# Patient Record
Sex: MALE | Race: WHITE
[De-identification: names, ages, dates, MRNs, and addresses within clinical notes are randomized per-mention and may not be internally consistent; named-entity substitution may affect disease eponyms.]

---

## 2018-02-28 ENCOUNTER — HOSPITAL ENCOUNTER (EMERGENCY)
Dept: HOSPITAL 91 - FTE | Age: 38
Discharge: HOME | End: 2018-02-28
Payer: COMMERCIAL

## 2018-02-28 ENCOUNTER — HOSPITAL ENCOUNTER (EMERGENCY)
Age: 38
Discharge: HOME | End: 2018-02-28

## 2018-02-28 DIAGNOSIS — H66.91: Primary | ICD-10-CM

## 2018-02-28 PROCEDURE — 99283 EMERGENCY DEPT VISIT LOW MDM: CPT

## 2019-05-22 ENCOUNTER — HOSPITAL ENCOUNTER (EMERGENCY)
Dept: HOSPITAL 12 - ER | Age: 39
Discharge: HOME | End: 2019-05-22
Payer: COMMERCIAL

## 2019-05-22 VITALS — HEIGHT: 72 IN | WEIGHT: 145 LBS | BODY MASS INDEX: 19.64 KG/M2

## 2019-05-22 VITALS — DIASTOLIC BLOOD PRESSURE: 86 MMHG | SYSTOLIC BLOOD PRESSURE: 122 MMHG

## 2019-05-22 DIAGNOSIS — J44.9: ICD-10-CM

## 2019-05-22 DIAGNOSIS — R19.7: ICD-10-CM

## 2019-05-22 DIAGNOSIS — J22: Primary | ICD-10-CM

## 2019-05-22 DIAGNOSIS — Z59.0: ICD-10-CM

## 2019-05-22 DIAGNOSIS — F17.290: ICD-10-CM

## 2019-05-22 PROCEDURE — 99406 BEHAV CHNG SMOKING 3-10 MIN: CPT

## 2019-05-22 PROCEDURE — 94640 AIRWAY INHALATION TREATMENT: CPT

## 2019-05-22 PROCEDURE — A4663 DIALYSIS BLOOD PRESSURE CUFF: HCPCS

## 2019-05-22 PROCEDURE — 71045 X-RAY EXAM CHEST 1 VIEW: CPT

## 2019-05-22 PROCEDURE — 99283 EMERGENCY DEPT VISIT LOW MDM: CPT

## 2019-05-22 SDOH — ECONOMIC STABILITY - HOUSING INSECURITY: HOMELESSNESS: Z59.0

## 2019-06-02 ENCOUNTER — HOSPITAL ENCOUNTER (INPATIENT)
Dept: HOSPITAL 91 - E/R | Age: 39
LOS: 6 days | Discharge: LEFT BEFORE DISCHARGE | DRG: 853 | End: 2019-06-08
Payer: COMMERCIAL

## 2019-06-02 ENCOUNTER — HOSPITAL ENCOUNTER (INPATIENT)
Dept: HOSPITAL 10 - E/R | Age: 39
LOS: 6 days | Discharge: LEFT BEFORE DISCHARGE | DRG: 853 | End: 2019-06-08
Attending: INTERNAL MEDICINE | Admitting: INTERNAL MEDICINE
Payer: COMMERCIAL

## 2019-06-02 VITALS
WEIGHT: 142.42 LBS | HEIGHT: 70 IN | BODY MASS INDEX: 20.39 KG/M2 | HEIGHT: 70 IN | WEIGHT: 142.42 LBS | BODY MASS INDEX: 20.39 KG/M2

## 2019-06-02 DIAGNOSIS — Z72.0: ICD-10-CM

## 2019-06-02 DIAGNOSIS — J18.9: ICD-10-CM

## 2019-06-02 DIAGNOSIS — F11.20: ICD-10-CM

## 2019-06-02 DIAGNOSIS — A41.9: Primary | ICD-10-CM

## 2019-06-02 DIAGNOSIS — L02.31: ICD-10-CM

## 2019-06-02 PROCEDURE — 84484 ASSAY OF TROPONIN QUANT: CPT

## 2019-06-02 PROCEDURE — 74177 CT ABD & PELVIS W/CONTRAST: CPT

## 2019-06-02 PROCEDURE — 85730 THROMBOPLASTIN TIME PARTIAL: CPT

## 2019-06-02 PROCEDURE — 84443 ASSAY THYROID STIM HORMONE: CPT

## 2019-06-02 PROCEDURE — 84100 ASSAY OF PHOSPHORUS: CPT

## 2019-06-02 PROCEDURE — 71045 X-RAY EXAM CHEST 1 VIEW: CPT

## 2019-06-02 PROCEDURE — 83690 ASSAY OF LIPASE: CPT

## 2019-06-02 PROCEDURE — 85610 PROTHROMBIN TIME: CPT

## 2019-06-02 PROCEDURE — 87102 FUNGUS ISOLATION CULTURE: CPT

## 2019-06-02 PROCEDURE — 87040 BLOOD CULTURE FOR BACTERIA: CPT

## 2019-06-02 PROCEDURE — 87075 CULTR BACTERIA EXCEPT BLOOD: CPT

## 2019-06-02 PROCEDURE — 83735 ASSAY OF MAGNESIUM: CPT

## 2019-06-02 PROCEDURE — 87070 CULTURE OTHR SPECIMN AEROBIC: CPT

## 2019-06-02 PROCEDURE — 80048 BASIC METABOLIC PNL TOTAL CA: CPT

## 2019-06-02 PROCEDURE — 85025 COMPLETE CBC W/AUTO DIFF WBC: CPT

## 2019-06-02 PROCEDURE — 83036 HEMOGLOBIN GLYCOSYLATED A1C: CPT

## 2019-06-02 PROCEDURE — 80053 COMPREHEN METABOLIC PANEL: CPT

## 2019-06-02 PROCEDURE — 80202 ASSAY OF VANCOMYCIN: CPT

## 2019-06-02 PROCEDURE — 73510: CPT

## 2019-06-02 PROCEDURE — 83605 ASSAY OF LACTIC ACID: CPT

## 2019-06-02 PROCEDURE — 93005 ELECTROCARDIOGRAM TRACING: CPT

## 2019-06-03 VITALS — RESPIRATION RATE: 13 BRPM | DIASTOLIC BLOOD PRESSURE: 53 MMHG | HEART RATE: 90 BPM | SYSTOLIC BLOOD PRESSURE: 95 MMHG

## 2019-06-03 VITALS — SYSTOLIC BLOOD PRESSURE: 109 MMHG | HEART RATE: 86 BPM | RESPIRATION RATE: 15 BRPM | DIASTOLIC BLOOD PRESSURE: 72 MMHG

## 2019-06-03 VITALS — DIASTOLIC BLOOD PRESSURE: 73 MMHG | HEART RATE: 90 BPM | SYSTOLIC BLOOD PRESSURE: 105 MMHG | RESPIRATION RATE: 16 BRPM

## 2019-06-03 VITALS — DIASTOLIC BLOOD PRESSURE: 64 MMHG | SYSTOLIC BLOOD PRESSURE: 105 MMHG | HEART RATE: 86 BPM | RESPIRATION RATE: 17 BRPM

## 2019-06-03 VITALS — HEART RATE: 86 BPM | SYSTOLIC BLOOD PRESSURE: 104 MMHG | DIASTOLIC BLOOD PRESSURE: 66 MMHG | RESPIRATION RATE: 17 BRPM

## 2019-06-03 VITALS — RESPIRATION RATE: 16 BRPM | SYSTOLIC BLOOD PRESSURE: 102 MMHG | DIASTOLIC BLOOD PRESSURE: 64 MMHG | HEART RATE: 86 BPM

## 2019-06-03 VITALS — HEART RATE: 90 BPM | RESPIRATION RATE: 18 BRPM | SYSTOLIC BLOOD PRESSURE: 105 MMHG | DIASTOLIC BLOOD PRESSURE: 58 MMHG

## 2019-06-03 VITALS — RESPIRATION RATE: 16 BRPM | DIASTOLIC BLOOD PRESSURE: 71 MMHG | HEART RATE: 82 BPM | SYSTOLIC BLOOD PRESSURE: 107 MMHG

## 2019-06-03 VITALS — DIASTOLIC BLOOD PRESSURE: 50 MMHG | RESPIRATION RATE: 16 BRPM | HEART RATE: 81 BPM | SYSTOLIC BLOOD PRESSURE: 93 MMHG

## 2019-06-03 VITALS — RESPIRATION RATE: 18 BRPM | HEART RATE: 75 BPM | DIASTOLIC BLOOD PRESSURE: 69 MMHG | SYSTOLIC BLOOD PRESSURE: 106 MMHG

## 2019-06-03 VITALS — HEART RATE: 90 BPM | DIASTOLIC BLOOD PRESSURE: 71 MMHG | SYSTOLIC BLOOD PRESSURE: 98 MMHG | RESPIRATION RATE: 14 BRPM

## 2019-06-03 VITALS — HEART RATE: 84 BPM | DIASTOLIC BLOOD PRESSURE: 67 MMHG | RESPIRATION RATE: 17 BRPM | SYSTOLIC BLOOD PRESSURE: 107 MMHG

## 2019-06-03 VITALS — DIASTOLIC BLOOD PRESSURE: 62 MMHG | SYSTOLIC BLOOD PRESSURE: 101 MMHG | HEART RATE: 84 BPM | RESPIRATION RATE: 18 BRPM

## 2019-06-03 VITALS — RESPIRATION RATE: 18 BRPM | DIASTOLIC BLOOD PRESSURE: 68 MMHG | SYSTOLIC BLOOD PRESSURE: 109 MMHG | HEART RATE: 84 BPM

## 2019-06-03 VITALS — SYSTOLIC BLOOD PRESSURE: 93 MMHG | HEART RATE: 95 BPM | RESPIRATION RATE: 20 BRPM | DIASTOLIC BLOOD PRESSURE: 58 MMHG

## 2019-06-03 VITALS — DIASTOLIC BLOOD PRESSURE: 55 MMHG | SYSTOLIC BLOOD PRESSURE: 97 MMHG | RESPIRATION RATE: 15 BRPM | HEART RATE: 86 BPM

## 2019-06-03 VITALS — DIASTOLIC BLOOD PRESSURE: 62 MMHG | SYSTOLIC BLOOD PRESSURE: 110 MMHG | HEART RATE: 82 BPM | RESPIRATION RATE: 17 BRPM

## 2019-06-03 VITALS — HEART RATE: 80 BPM | RESPIRATION RATE: 16 BRPM | SYSTOLIC BLOOD PRESSURE: 98 MMHG | DIASTOLIC BLOOD PRESSURE: 60 MMHG

## 2019-06-03 VITALS — RESPIRATION RATE: 19 BRPM | HEART RATE: 82 BPM | DIASTOLIC BLOOD PRESSURE: 62 MMHG | SYSTOLIC BLOOD PRESSURE: 111 MMHG

## 2019-06-03 LAB
ADD MAN DIFF?: NO
ALANINE AMINOTRANSFERASE: 37 IU/L (ref 13–69)
ALBUMIN/GLOBULIN RATIO: 0.93
ALBUMIN: 4.1 G/DL (ref 3.3–4.9)
ALKALINE PHOSPHATASE: 182 IU/L (ref 42–121)
ANION GAP: 13 (ref 5–13)
ASPARTATE AMINO TRANSFERASE: 40 IU/L (ref 15–46)
BASOPHIL #: 0.1 10^3/UL (ref 0–0.1)
BASOPHILS %: 0.3 % (ref 0–2)
BILIRUBIN,DIRECT: 0 MG/DL (ref 0–0.2)
BILIRUBIN,TOTAL: 0.9 MG/DL (ref 0.2–1.3)
BLOOD UREA NITROGEN: 9 MG/DL (ref 7–20)
CALCIUM: 9.7 MG/DL (ref 8.4–10.2)
CARBON DIOXIDE: 31 MMOL/L (ref 21–31)
CHLORIDE: 90 MMOL/L (ref 97–110)
CREATININE: 0.75 MG/DL (ref 0.61–1.24)
EOSINOPHILS #: 0 10^3/UL (ref 0–0.5)
EOSINOPHILS %: 0 % (ref 0–7)
GLOBULIN: 4.4 G/DL (ref 1.3–3.2)
GLUCOSE: 124 MG/DL (ref 70–220)
HEMATOCRIT: 42.4 % (ref 42–52)
HEMOGLOBIN: 14.8 G/DL (ref 14–18)
IMMATURE GRANS #M: 0.14 10^3/UL (ref 0–0.03)
IMMATURE GRANS % (M): 0.7 % (ref 0–0.43)
INR: 1.18
LACTIC ACID: 1 MMOL/L (ref 0.5–2)
LACTIC ACID: 2.6 MMOL/L (ref 0.5–2)
LIPASE: 59 U/L (ref 23–300)
LYMPHOCYTES #: 1.2 10^3/UL (ref 0.8–2.9)
LYMPHOCYTES %: 5.6 % (ref 15–51)
MEAN CORPUSCULAR HEMOGLOBIN: 30.6 PG (ref 29–33)
MEAN CORPUSCULAR HGB CONC: 34.9 G/DL (ref 32–37)
MEAN CORPUSCULAR VOLUME: 87.6 FL (ref 82–101)
MEAN PLATELET VOLUME: 8.3 FL (ref 7.4–10.4)
MONOCYTE #: 1.4 10^3/UL (ref 0.3–0.9)
MONOCYTES %: 6.5 % (ref 0–11)
NEUTROPHIL #: 18.4 10^3/UL (ref 1.6–7.5)
NEUTROPHILS %: 86.9 % (ref 39–77)
NUCLEATED RED BLOOD CELLS #: 0 10^3/UL (ref 0–0)
NUCLEATED RED BLOOD CELLS%: 0 /100WBC (ref 0–0)
PARTIAL THROMBOPLASTIN TIME: 33.5 SEC (ref 23–35)
PLATELET COUNT: 453 10^3/UL (ref 140–415)
POTASSIUM: 4.1 MMOL/L (ref 3.5–5.1)
PROTIME: 15.1 SEC (ref 11.9–14.9)
PT RATIO: 1.2
RED BLOOD COUNT: 4.84 10^6/UL (ref 4.7–6.1)
RED CELL DISTRIBUTION WIDTH: 12 % (ref 11.5–14.5)
SODIUM: 134 MMOL/L (ref 135–144)
TOTAL PROTEIN: 8.5 G/DL (ref 6.1–8.1)
TROPONIN-I: < 0.012 NG/ML (ref 0–0.12)
WHITE BLOOD COUNT: 21.2 10^3/UL (ref 4.8–10.8)

## 2019-06-03 PROCEDURE — 0J990ZZ DRAINAGE OF BUTTOCK SUBCUTANEOUS TISSUE AND FASCIA, OPEN APPROACH: ICD-10-PCS | Performed by: SURGERY

## 2019-06-03 PROCEDURE — 0J990ZZ DRAINAGE OF BUTTOCK SUBCUTANEOUS TISSUE AND FASCIA, OPEN APPROACH: ICD-10-PCS

## 2019-06-03 RX ADMIN — ASCORBIC ACID, VITAMIN A PALMITATE, CHOLECALCIFEROL, THIAMINE HYDROCHLORIDE, RIBOFLAVIN-5 PHOSPHATE SODIUM, PYRIDOXINE HYDROCHLORIDE, NIACINAMIDE, DEXPANTHENOL, ALPHA-TOCOPHEROL ACETATE, VITAMIN K1, FOLIC ACID, BIOTIN, CYANOCOBALAMIN SCH MLS/HR: 200; 3300; 200; 6; 3.6; 6; 40; 15; 10; 150; 600; 60; 5 INJECTION, SOLUTION INTRAVENOUS at 20:37

## 2019-06-03 RX ADMIN — MORPHINE SULFATE 1 MG: 2 INJECTION, SOLUTION INTRAMUSCULAR; INTRAVENOUS at 04:19

## 2019-06-03 RX ADMIN — PIPERACILLIN SODIUM AND TAZOBACTAM SODIUM 1 MLS/HR: 3; .375 INJECTION, POWDER, LYOPHILIZED, FOR SOLUTION INTRAVENOUS at 13:09

## 2019-06-03 RX ADMIN — ONDANSETRON HYDROCHLORIDE 1 MG: 2 INJECTION, SOLUTION INTRAMUSCULAR; INTRAVENOUS at 00:32

## 2019-06-03 RX ADMIN — MORPHINE SULFATE 1 MG: 2 INJECTION, SOLUTION INTRAMUSCULAR; INTRAVENOUS at 08:25

## 2019-06-03 RX ADMIN — PIPERACILLIN SODIUM AND TAZOBACTAM SODIUM 1 MLS/HR: 3; .375 INJECTION, POWDER, LYOPHILIZED, FOR SOLUTION INTRAVENOUS at 01:45

## 2019-06-03 RX ADMIN — SODIUM CHLORIDE 1 MG: 9 INJECTION, SOLUTION INTRAVENOUS at 10:03

## 2019-06-03 RX ADMIN — SODIUM CHLORIDE 1 MG: 9 INJECTION, SOLUTION INTRAVENOUS at 13:57

## 2019-06-03 RX ADMIN — THIAMINE HYDROCHLORIDE 1 MLS/HR: 100 INJECTION, SOLUTION INTRAMUSCULAR; INTRAVENOUS at 15:54

## 2019-06-03 RX ADMIN — FOLIC ACID SCH MLS/HR: 5 INJECTION, SOLUTION INTRAMUSCULAR; INTRAVENOUS; SUBCUTANEOUS at 03:27

## 2019-06-03 RX ADMIN — PIPERACILLIN SODIUM AND TAZOBACTAM SODIUM SCH MLS/HR: 3; .375 INJECTION, POWDER, LYOPHILIZED, FOR SOLUTION INTRAVENOUS at 13:09

## 2019-06-03 RX ADMIN — PIPERACILLIN SODIUM AND TAZOBACTAM SODIUM SCH MLS/HR: 3; .375 INJECTION, POWDER, LYOPHILIZED, FOR SOLUTION INTRAVENOUS at 05:48

## 2019-06-03 RX ADMIN — MIDAZOLAM HYDROCHLORIDE 1 MG: 2 INJECTION, SOLUTION INTRAMUSCULAR; INTRAVENOUS at 18:47

## 2019-06-03 RX ADMIN — THIAMINE HYDROCHLORIDE 1 ML: 100 INJECTION, SOLUTION INTRAMUSCULAR; INTRAVENOUS at 00:33

## 2019-06-03 RX ADMIN — FOLIC ACID SCH MLS/HR: 5 INJECTION, SOLUTION INTRAMUSCULAR; INTRAVENOUS; SUBCUTANEOUS at 15:54

## 2019-06-03 RX ADMIN — HYDROMORPHONE HYDROCHLORIDE 1 MG: 1 INJECTION, SOLUTION INTRAMUSCULAR; INTRAVENOUS; SUBCUTANEOUS at 02:14

## 2019-06-03 RX ADMIN — SODIUM CHLORIDE 1 MG: 9 INJECTION, SOLUTION INTRAVENOUS at 05:47

## 2019-06-03 RX ADMIN — VANCOMYCIN HYDROCHLORIDE 1 MLS/HR: 1 INJECTION, POWDER, LYOPHILIZED, FOR SOLUTION INTRAVENOUS at 02:33

## 2019-06-03 RX ADMIN — PIPERACILLIN SODIUM AND TAZOBACTAM SODIUM SCH MLS/HR: 3; .375 INJECTION, POWDER, LYOPHILIZED, FOR SOLUTION INTRAVENOUS at 20:37

## 2019-06-03 RX ADMIN — THIAMINE HYDROCHLORIDE 1 MLS/HR: 100 INJECTION, SOLUTION INTRAMUSCULAR; INTRAVENOUS at 03:27

## 2019-06-03 RX ADMIN — SODIUM CHLORIDE SCH MLS/HR: 0.9 INJECTION, SOLUTION INTRAVENOUS at 13:59

## 2019-06-03 RX ADMIN — VASOPRESSIN 1 ML/8 S: 20 INJECTION, SOLUTION INTRAMUSCULAR; SUBCUTANEOUS at 13:25

## 2019-06-03 RX ADMIN — IOHEXOL 1 ML: 300 INJECTION, SOLUTION INTRAVENOUS at 13:25

## 2019-06-03 RX ADMIN — LIDOCAINE HYDROCHLORIDE 1 MLS/HR: 10 INJECTION, SOLUTION EPIDURAL; INFILTRATION; INTRACAUDAL; PERINEURAL at 01:05

## 2019-06-03 RX ADMIN — PIPERACILLIN SODIUM AND TAZOBACTAM SODIUM 1 MLS/HR: 3; .375 INJECTION, POWDER, LYOPHILIZED, FOR SOLUTION INTRAVENOUS at 05:48

## 2019-06-03 RX ADMIN — SODIUM CHLORIDE 1 ML: 9 INJECTION, SOLUTION INTRAMUSCULAR; INTRAVENOUS; SUBCUTANEOUS at 13:25

## 2019-06-03 RX ADMIN — ASCORBIC ACID, VITAMIN A PALMITATE, CHOLECALCIFEROL, THIAMINE HYDROCHLORIDE, RIBOFLAVIN-5 PHOSPHATE SODIUM, PYRIDOXINE HYDROCHLORIDE, NIACINAMIDE, DEXPANTHENOL, ALPHA-TOCOPHEROL ACETATE, VITAMIN K1, FOLIC ACID, BIOTIN, CYANOCOBALAMIN 1 MLS/HR: 200; 3300; 200; 6; 3.6; 6; 40; 15; 10; 150; 600; 60; 5 INJECTION, SOLUTION INTRAVENOUS at 20:37

## 2019-06-03 RX ADMIN — PIPERACILLIN SODIUM AND TAZOBACTAM SODIUM 1 MLS/HR: 3; .375 INJECTION, POWDER, LYOPHILIZED, FOR SOLUTION INTRAVENOUS at 20:37

## 2019-06-03 RX ADMIN — VANCOMYCIN HYDROCHLORIDE 1 MLS/HR: 500 INJECTION, POWDER, LYOPHILIZED, FOR SOLUTION INTRAVENOUS at 13:59

## 2019-06-03 NOTE — CONS
Assessment/Plan


Assessment/Plan


Assessment/Plan (Daily)


Left gluteal/buttock abscess


Will need I&D in OR under IV sedation


All benefits, risks, alternatives discussed in detail.  All questions answered. 


The patient elects to proceed.





Consultation Date/Type/Reason


Admit Date/Time


3 Shayy 2019


Date/Time of Note


DATE: 6/3/19 


TIME: 16:53





Hx of Present Illness


The patient is a 38-year-old male who did intramuscular injection of heroin 


approximately 3 weeks ago.  He likely is a dirty needle.  He started developing 


pain and discomfort soon after.  The pain and discomfort got worse over the last


4 days he could not walk due to the infection.  He has fevers and chills.  He 


has some nausea.





Homeless and finally called an ambulance was brought to the ER.  His work-up was


consistent with a left buttock abscess.





Past Medical History


Medical History:  other (IV drug abuse)


Home Meds


Active Scripts


Amoxicillin* (Amoxicillin*) 500 Mg Cap, 500 MG PO BID for 7 Days, CAP


   Prov:ARNAV ROTHMAN         18


Ibuprofen* (Motrin*) 600 Mg Tab, 600 MG PO Q6, #30 TAB


   Prov:ARNAV ROTHMAN         18


Medications





Current Medications


Sodium Chloride 1,000 ml @  75 mls/hr [id number] IV  Last administered on 6/3/19at 


03:27; Admin Dose 75 MLS/HR;  Start 6/3/19 at 02:34


IV Flush (NS 3 ml) 3 ml PER PROTOCOL IV ;  Start 6/3/19 at 03:00


Ondansetron HCl (Zofran Inj) 4 mg Q6H  PRN IV NAUSEA/VOMITING;  Start 6/3/19 at 


03:00


Morphine Sulfate (morphine) 2 mg Q4H  PRN IV .SEVERE PAIN 7-10 Last administered


on 6/3/19at 04:19; Admin Dose 2 MG;  Start 6/3/19 at 03:00


Piperacillin Sod/ Tazobactam Sod 100 ml @  200 mls/hr Q6 IVPB  Last administered


on 6/3/19at 13:09; Admin Dose 200 MLS/HR;  Start 6/3/19 at 06:00


Vancomycin HCl (Vanco Iv Per Pharmacy) VANCOMYCIN PER PHARMACY PER PROTOCOL XX ;


 Start 6/3/19 at 03:30


Albuterol/ Ipratropium (Duoneb) 3 ml Q4H RESP THERAPY  PRN HHN SHORTNESS OF 


BREATH;  Start 6/3/19 at 04:00


Vancomycin HCl 1.25 gm/Sodium Chloride 250 ml @  83.333 mls/ hr Q12H IVPB  Last 


administered on 6/3/19at 13:59; Admin Dose 83.333 MLS/HR;  Start 6/3/19 at 14:00


Morphine Sulfate (morphine) 6 mg Q4H  PRN IV SEVERE PAIN LEVEL 7-10 Last 


administered on 6/3/19at 13:57; Admin Dose 6 MG;  Start 6/3/19 at 05:30


Miscellaneous Information (*Rx Drug Level Order Reminder*) 1 1300  ONCE XX ;  


Start 19 at 13:00;  Stop 19 at 13:01


Allergies:  


Coded Allergies:  


     No Known Drug Allergies (Verified  Allergy, Unknown, 18)





Past Surgical History


Past Surgical Hx:  other (Left hand surgery)





Social History


Alcohol Use:  none


Smoking Status:  Current every day smoker (1-2 cigs per day )


Drug Use:  heroin (Including intra-muscular injection. ), other (Methamphetamine


twice a week)





Exam/Review of Systems


Exam


Vitals





Vital Signs


  Date      Temp  Pulse  Resp  B/P (MAP)   Pulse Ox  O2          O2 Flow    FiO2


Time                                                 Delivery    Rate


    6/3/19  99.2     80    16  98/60 (73)        92


     14:50


    6/3/19                                           Room Air


     02:52








Intake and Output





6/2/19


6/2/19


6/3/19





1515:00


23:00


07:00





IntakeIntake Total


463 ml





OutputOutput Total


300 ml





BalanceBalance


163 ml











Constitutional:  alert, oriented, well developed


Psych:  no complaints


Head:  normocephalic, atraumatic


ENMT:  nl external ears & nose


Neck:  supple


Respiratory:  clear to auscultation, normal air movement


Cardiovascular:  regular rate and rhythm


Gastrointestinal:  soft, nl liver, spleen, non-tender


Musculoskeletal:  nl extremities to inspection, nl gait and stance


Extremities:  normal pulses


Neurological:  CNS II-XII intact, nl mental status


Skin:  other (Tender, erythematous, indurated area to left buttock/flank 


consistent with abscess)


Lymph:  nl lymph nodes





Results


Result Diagram:  


6/3/19 0028                                                                     


          6/3/19 0028





Results 24hrs





Laboratory Tests


Test
                     6/3/19
00:20  6/3/19
00:28  6/3/19
02:20  6/3/19
04:20


POC Venous Lactate             2.2  *H


White Blood Count                            21.2  H


Red Blood Count                               4.84


Hemoglobin                                    14.8


Hematocrit                                    42.4


Mean Corpuscular Volume                       87.6


Mean Corpuscular                              30.6


Hemoglobin


Mean Corpuscular          
                  34.9  
  
             



Hemoglobin
Concent


Red Cell Distribution                         12.0


Width


Platelet Count                                453  H


Mean Platelet Volume                           8.3


Immature Granulocytes %                     0.700  H


Neutrophils %                                86.9  H


Lymphocytes %                                 5.6  L


Monocytes %                                    6.5


Eosinophils %                                  0.0


Basophils %                                    0.3


Nucleated Red Blood                            0.0


Cells %


Immature Granulocytes #                     0.140  H


Neutrophils #                                18.4  H


Lymphocytes #                                  1.2


Monocytes #                                   1.4  H


Eosinophils #                                  0.0


Basophils #                                    0.1


Nucleated Red Blood                            0.0


Cells #


Prothrombin Time                             15.1  H


Prothrombin Time Ratio                         1.2


INR International         
                  1.18  
  
             



Normalized
Ratio


Activated                 
                  33.5  
  
             



Partial
Thromboplast


Time


Sodium Level                                  134  L


Potassium Level                                4.1


Chloride Level                                 90  L


Carbon Dioxide Level                            31


Anion Gap                                       13


Blood Urea Nitrogen                              9


Creatinine                                    0.75


Est Glomerular Filtrat    
             > 60  
       
             



Rate
mL/min


Glucose Level                                  124


Calcium Level                                  9.7


Total Bilirubin                                0.9


Direct Bilirubin                              0.00


Indirect Bilirubin                             0.9


Aspartate Amino           
                    40  
  
             



Transf
(AST/SGOT)


Alanine                   
                    37  
  
             



Aminotransferase
(ALT/SG


PT)


Alkaline Phosphatase                          182  H


Troponin I                              < 0.012


Total Protein                                 8.5  H


Albumin                                        4.1


Globulin                                     4.40  H


Albumin/Globulin Ratio                        0.93


Lipase                                          59


Lactic Acid Level                                            1.0         2.6  *H








Imaging


Imaging


Patient: JENNIFER TRENT   : 1980   Age: 38  Sex: M                    


   


       MR #:    [id number]   Acct #:   [id number]    DOS: 19 0305


Ordering MD: KAREN BLACK MD   Location:  [facility]   Room/Bed:  [facility]            


                             


                                        


PROCEDURE:   CT Abdomen and Pelvis with contrast. 


 


CLINICAL INDICATION:   Left hip abscess


 


TECHNIQUE:   CT scan of the abdomen and pelvis with contrast was performed on a 


multi-detector high-resolution CT scanner.  The patient was scanned following 


the uncomplicated intravenous administration of 90 cc of Omnipaque-300 IV 


contrast.  Coronal and sagittal reformatted images were obtained from the axial 


source images. DICOM images are available. CTDI equals 6.76 mGy, and DLP equals 


474.78 mGy-cm.


 


One or more of the following dose reduction techniques were used:


- Automated exposure control.


- Adjustment of the mA and/or kV according to patient size.


- Use of iterative reconstruction technique.


 


COMPARISON: None. 


 


FINDINGS:


 


Lower thorax: Patchy ground-glass opacities of the lateral right lower lung may 


represent atelectasis versus small area of infiltrate. Small focus of infiltrate


seen in the left lower lung.


 


Liver: Normal. Patent portal vein.


 


Biliary: Normal gallbladder. No biliary dilatation. 


 


Pancreas: Normal.


 


Spleen: Normal.


 


Adrenal Glands: Normal.


 


Genitourinary: No hydronephrosis or nephrolithiasis. Bladder is moderately 


distended, otherwise unremarkable.


 


Gastrointestinal: Stomach is decompressed. Gaseous distension of the colon as 


well as multiple small bowel loops likely suggestive of ileus. No evidence of 


bowel obstruction.


 


Lymph nodes: No adenopathy.


 


Vascular: Normal-caliber of the abdominal aorta.


 


Peritoneum/mesentery: Trace free pelvic fluid.


 


Reproductive organs: Normal.


 


Musculoskeletal: There is elongated walled off fluid collection with rim 


enhancement in the subcutaneous soft tissue overlying superior aspect of the 


left gluteal region measures 11.5 x 3.0 x 8.3 cm. Additional more complex walled


off fluid collection with peripheral enhancement more distally measures 8.2 x 


3.2 x 5.5 cm which likely extends to the adjacent gluteal muscles.. Subcutaneous


air with likely skin laceration seen in the most lateral portion of the 


collection.


 


Abdominal wall: Normal


 


IMPRESSION:


 


1.  There are 2 walled off fluid collections with peripheral enhancement in the 


subcutaneous soft tissue overlying left gluteal region measures 11.5 and 8.2 cm 


and likely represents an abscess.


2.  Mild gaseous distension of the multiple loops of small and large bowel 


likely suggestive of mild ileus.


3.  Patchy ground-glass opacities of the periphery of the right lower lobe as 


well as small area of left lung base likely due to atelectasis versus 


infectious/inflammatory process.


 


 


RPTAT: QQ


_____________________________________________ 


rm-isaiah Mccall, Physician           Date    Time





Medications


Medication





Current Medications


Sodium Chloride 1,000 ml @  75 mls/hr [id number] IV  Last administered on 6/3/19at 


03:27; Admin Dose 75 MLS/HR;  Start 6/3/19 at 02:34


IV Flush (NS 3 ml) 3 ml PER PROTOCOL IV ;  Start 6/3/19 at 03:00


Ondansetron HCl (Zofran Inj) 4 mg Q6H  PRN IV NAUSEA/VOMITING;  Start 6/3/19 at 


03:00


Morphine Sulfate (morphine) 2 mg Q4H  PRN IV .SEVERE PAIN 7-10 Last administered


on 6/3/19at 04:19; Admin Dose 2 MG;  Start 6/3/19 at 03:00


Piperacillin Sod/ Tazobactam Sod 100 ml @  200 mls/hr Q6 IVPB  Last administered


on 6/3/19at 13:09; Admin Dose 200 MLS/HR;  Start 6/3/19 at 06:00


Vancomycin HCl (Vanco Iv Per Pharmacy) VANCOMYCIN PER PHARMACY PER PROTOCOL XX ;


 Start 6/3/19 at 03:30


Albuterol/ Ipratropium (Duoneb) 3 ml Q4H RESP THERAPY  PRN HHN SHORTNESS OF 


BREATH;  Start 6/3/19 at 04:00


Vancomycin HCl 1.25 gm/Sodium Chloride 250 ml @  83.333 mls/ hr Q12H IVPB  Last 


administered on 6/3/19at 13:59; Admin Dose 83.333 MLS/HR;  Start 6/3/19 at 14:00


Morphine Sulfate (morphine) 6 mg Q4H  PRN IV SEVERE PAIN LEVEL 7-10 Last 


administered on 6/3/19at 13:57; Admin Dose 6 MG;  Start 6/3/19 at 05:30


Miscellaneous Information (*Rx Drug Level Order Reminder*) 1 1300  ONCE XX ;  


Start 19 at 13:00;  Stop 19 at 13:01











WILLIS BARNES MD            Jose 3, 2019 16:55

## 2019-06-03 NOTE — PAC
Date/Time of Note


Date/Time of Note


DATE: 6/3/19 


TIME: 18:32





Post-Anesthesia Notes


Post-Anesthesia Note


Last documented vital signs





Vital Signs


  Date      Temp  Pulse  Resp  B/P (MAP)   Pulse Ox  O2          O2 Flow    FiO2


Time                                                 Delivery    Rate


    6/3/19  99.2     80    16  98/60 (73)        92


     14:50


    6/3/19                                           Room Air


     02:52





Activity:  WNL


Respiratory function:  WNL


Cardiovascular function:  WNL


Mental status:  Baseline


Pain reasonably controlled:  Yes


Hydration appropriate:  Yes


Nausea/Vomiting absent:  Yes











Juan Thornton M.D.       Jose 3, 2019 18:32

## 2019-06-03 NOTE — OPR
Date/Time of Note


Date/Time of Note


DATE: 6/3/19 


TIME: 17:52





Operative Report


Preoperative Diagnosis


Left gluteal abscess


Postoperative Diagnosis


Same


Operation/Procedure Performed


Incision and drainage of left gluteal abscess


Surgeon


see signature line


Assistant


None


Anesthesia Type:  general


Anesthesiologist:  Juan Thornton M.D.


Estimated Blood Loss:  minimal


Transfusion


   none


Specimen


Cultures


Grafts/Implants


none


Complications


none


Pt Condition Post Procedure:  stable


Disposition:  PACU


Indications


The patient is a 38-year-old IV drug abuser.  He injected heroin into his left 


buttock 3 weeks ago.  He developed a large abscess.  He requires incision and 


drainage.  All benefits, risks, alternatives were discussed in detail.  All 


questions answered.  The patient elects to proceed.


Procedure Description


The patient was brought to operative room placed in the right lateral decubitus 


position.  IV sedation was given.  





The left buttocks, flank and leg were cleaned, prepped, draped in usual sterile 


fashion.  The area was widely infiltrated with half percent Marcaine.  A 


cruciate incision was made over the abscess.  Pus was expressed.  Cultures were 


taken.  The all loculations were broken up.  The wound was irrigated copiously. 


The wound was then packed with a Kerlix.  A dry sterile dressing was applied.





Postop procedure well, was extubated the OR, transferred to the recovery in 


stable condition.











WILLIS BARNES MD            Jose 3, 2019 17:54

## 2019-06-03 NOTE — PN
Date/Time of Note


Date/Time of Note


DATE: 6/3/19 


TIME: 14:34





Assessment/Plan


VTE Prophylaxis


Risk score (from Ns)>0 risk:  2


SCD applied (from Ns):  Yes


Pharmacological prophylaxis:  NA/contraindicated


Pharm contraindication:  surgical contra





Lines/Catheters


IV Catheter Type (from [facility]):  Peripheral IV





Assessment/Plan


Hospital Course


39 yo heroin addict presents with L hip abscess from intramuscular injection.





#Left hip abscess


- Will get CT pelvis to further characterize


- Dr. Hicks and general surgery consulted for surgical I&D.


- IV vanco, zosyn


- Followup blood cultures


- NPO for possible surgery.


- Patient reports ability to briskly climb 2 flights of stairs before his illne


ss. No signs or symptoms of heart failure. He is medically optimized to proceed 


to surgery without further medical or cardiac workup. 





#Asthma


- Albuterol prn





#Heroin use


- Patient is motivated to quit, social work consult appreciated, patient will be


placed in a drug rehab program after DC





DVT: SCDs


GI: None


Result Diagram:  


6/3/19 0028                                                                     


          6/3/19 0028





Results 24hrs





Laboratory Tests


Test
                     6/3/19
00:20  6/3/19
00:28  6/3/19
02:20  6/3/19
04:20


POC Venous Lactate             2.2  *H


White Blood Count                            21.2  H


Red Blood Count                               4.84


Hemoglobin                                    14.8


Hematocrit                                    42.4


Mean Corpuscular Volume                       87.6


Mean Corpuscular                              30.6


Hemoglobin


Mean Corpuscular          
                  34.9  
  
             



Hemoglobin
Concent


Red Cell Distribution                         12.0


Width


Platelet Count                                453  H


Mean Platelet Volume                           8.3


Immature Granulocytes %                     0.700  H


Neutrophils %                                86.9  H


Lymphocytes %                                 5.6  L


Monocytes %                                    6.5


Eosinophils %                                  0.0


Basophils %                                    0.3


Nucleated Red Blood                            0.0


Cells %


Immature Granulocytes #                     0.140  H


Neutrophils #                                18.4  H


Lymphocytes #                                  1.2


Monocytes #                                   1.4  H


Eosinophils #                                  0.0


Basophils #                                    0.1


Nucleated Red Blood                            0.0


Cells #


Prothrombin Time                             15.1  H


Prothrombin Time Ratio                         1.2


INR International         
                  1.18  
  
             



Normalized
Ratio


Activated                 
                  33.5  
  
             



Partial
Thromboplast


Time


Sodium Level                                  134  L


Potassium Level                                4.1


Chloride Level                                 90  L


Carbon Dioxide Level                            31


Anion Gap                                       13


Blood Urea Nitrogen                              9


Creatinine                                    0.75


Est Glomerular Filtrat    
             > 60  
       
             



Rate
mL/min


Glucose Level                                  124


Calcium Level                                  9.7


Total Bilirubin                                0.9


Direct Bilirubin                              0.00


Indirect Bilirubin                             0.9


Aspartate Amino           
                    40  
  
             



Transf
(AST/SGOT)


Alanine                   
                    37  
  
             



Aminotransferase
(ALT/SG


PT)


Alkaline Phosphatase                          182  H


Troponin I                              < 0.012


Total Protein                                 8.5  H


Albumin                                        4.1


Globulin                                     4.40  H


Albumin/Globulin Ratio                        0.93


Lipase                                          59


Lactic Acid Level                                            1.0         2.6  *H








Subjective


24 Hr Interval Summary


Constitutional:  no complaints





Exam/Review of Systems


Exam


Vitals





Vital Signs


  Date      Temp  Pulse  Resp  B/P (MAP)   Pulse Ox  O2          O2 Flow    FiO2


Time                                                 Delivery    Rate


    6/3/19  99.2     90    18      105/58        94


     08:00                           (74)


    6/3/19                                           Room Air


     02:52








Intake and Output





6/2/19


6/2/19


6/3/19





1515:00


23:00


07:00





IntakeIntake Total


463 ml





OutputOutput Total


300 ml





BalanceBalance


163 ml











Constitutional:  alert, oriented


Respiratory:  clear to auscultation


Cardiovascular:  regular rate and rhythm


Gastrointestinal:  soft; 


   No distended


Musculoskeletal:  nl extremities to inspection





Results


Results 24hrs





Laboratory Tests


Test
                     6/3/19
00:20  6/3/19
00:28  6/3/19
02:20  6/3/19
04:20


POC Venous Lactate             2.2  *H


White Blood Count                            21.2  H


Red Blood Count                               4.84


Hemoglobin                                    14.8


Hematocrit                                    42.4


Mean Corpuscular Volume                       87.6


Mean Corpuscular                              30.6


Hemoglobin


Mean Corpuscular          
                  34.9  
  
             



Hemoglobin
Concent


Red Cell Distribution                         12.0


Width


Platelet Count                                453  H


Mean Platelet Volume                           8.3


Immature Granulocytes %                     0.700  H


Neutrophils %                                86.9  H


Lymphocytes %                                 5.6  L


Monocytes %                                    6.5


Eosinophils %                                  0.0


Basophils %                                    0.3


Nucleated Red Blood                            0.0


Cells %


Immature Granulocytes #                     0.140  H


Neutrophils #                                18.4  H


Lymphocytes #                                  1.2


Monocytes #                                   1.4  H


Eosinophils #                                  0.0


Basophils #                                    0.1


Nucleated Red Blood                            0.0


Cells #


Prothrombin Time                             15.1  H


Prothrombin Time Ratio                         1.2


INR International         
                  1.18  
  
             



Normalized
Ratio


Activated                 
                  33.5  
  
             



Partial
Thromboplast


Time


Sodium Level                                  134  L


Potassium Level                                4.1


Chloride Level                                 90  L


Carbon Dioxide Level                            31


Anion Gap                                       13


Blood Urea Nitrogen                              9


Creatinine                                    0.75


Est Glomerular Filtrat    
             > 60  
       
             



Rate
mL/min


Glucose Level                                  124


Calcium Level                                  9.7


Total Bilirubin                                0.9


Direct Bilirubin                              0.00


Indirect Bilirubin                             0.9


Aspartate Amino           
                    40  
  
             



Transf
(AST/SGOT)


Alanine                   
                    37  
  
             



Aminotransferase
(ALT/SG


PT)


Alkaline Phosphatase                          182  H


Troponin I                              < 0.012


Total Protein                                 8.5  H


Albumin                                        4.1


Globulin                                     4.40  H


Albumin/Globulin Ratio                        0.93


Lipase                                          59


Lactic Acid Level                                            1.0         2.6  *H








Medications


Medication





Current Medications


Sodium Chloride 1,000 ml @  75 mls/hr [id number] IV  Last administered on 6/3/19at 


03:27; Admin Dose 75 MLS/HR;  Start 6/3/19 at 02:34


IV Flush (NS 3 ml) 3 ml PER PROTOCOL IV ;  Start 6/3/19 at 03:00


Ondansetron HCl (Zofran Inj) 4 mg Q6H  PRN IV NAUSEA/VOMITING;  Start 6/3/19 at 


03:00


Morphine Sulfate (morphine) 2 mg Q4H  PRN IV .SEVERE PAIN 7-10 Last administered


on 6/3/19at 04:19; Admin Dose 2 MG;  Start 6/3/19 at 03:00


Piperacillin Sod/ Tazobactam Sod 100 ml @  200 mls/hr Q6 IVPB  Last administered


on 6/3/19at 13:09; Admin Dose 200 MLS/HR;  Start 6/3/19 at 06:00


Vancomycin HCl (Vanco Iv Per Pharmacy) VANCOMYCIN PER PHARMACY PER PROTOCOL XX ;


 Start 6/3/19 at 03:30


Albuterol/ Ipratropium (Duoneb) 3 ml Q4H RESP THERAPY  PRN HHN SHORTNESS OF 


BREATH;  Start 6/3/19 at 04:00


Vancomycin HCl 1.25 gm/Sodium Chloride 250 ml @  83.333 mls/ hr Q12H IVPB  Last 


administered on 6/3/19at 13:59; Admin Dose 83.333 MLS/HR;  Start 6/3/19 at 14:00


Morphine Sulfate (morphine) 6 mg Q4H  PRN IV SEVERE PAIN LEVEL 7-10 Last 


administered on 6/3/19at 13:57; Admin Dose 6 MG;  Start 6/3/19 at 05:30


Miscellaneous Information (*Rx Drug Level Order Reminder*) 1 1300  ONCE XX ;  


Start 6/4/19 at 13:00;  Stop 6/4/19 at 13:01











KIMMY STOVALL                   Jose 3, 2019 14:35

## 2019-06-03 NOTE — HP
Date/Time of Note


Date/Time of Note


DATE: 6/3/19 


TIME: 03:05





Assessment/Plan


VTE Prophylaxis


SCD applied (from Nsg):  Yes


Pharmacological prophylaxis:  NA/contraindicated


Pharm contraindication:  low risk/ambulating





Lines/Catheters


IV Catheter Type (from Nrsg):  Saline Lock





Assessment/Plan


Assessment/Plan


39 yo heroin addict presents with L hip abscess from intramuscular injection.





#Left hip abscess


- Will get CT pelvis to further characterize


- Dr. Hicks consulted for surgical I&D.


- IV vanco, zosyn


- Followup blood cultures


- NPO for possible surgery.


- Patient reports ability to briskly climb 2 flights of stairs before his 


illness. No signs or symptoms of heart failure. He is medically optimized to 


proceed to surgery without further medical or cardiac workup. 





#Asthma


- Albuterol prn





#Heroin use


- Patient is motivated to quit, social work consult after hip is addressed. 





DVT: SCDs


GI: None


Result Diagram:  


6/3/19 0028                                                                     


          6/3/19 0028








HPI/ROS


Admit Date/Time


Admit Date/Time


3 Shayy 2019





Hx of Present Illness


Mr. Pugh is an unfortunate 39 yo heroin addict who presents with left hip 


swelling and pain.





He was in his usual state of health until about three weeks ago, when he was 


seen at the [facility] ED for bronchitis. He had been injecting heroin into


his left gluteus for a few weeks prior and had noticed it was getting painful 


but declined to mention it. He was discharged with a course of amoxicillin for 


bronchitis of which he finished half the course. Over the past three weeks the 


hip grew more painful, swollen and it grew more difficult to walk. A few days 


prior to admission the patient used insulin syringes (which he uses for heroin) 


to aspirate pus from the abscess. He pulled out 150 cc. For the past three days 


that patient has been laying on the ground unable to walk due to pain; also with


subjective fevers. He had an episode of fecal and urinary incontinence. He 


called an ambulance and came to the ED. 





In the ED he was afebrile, vitals normal. WBC 21.2, lactate 2.2. Otherwise labs 


normal .





ROS


He denies chills, night sweats, anorexia, weight loss, headache, dizziness, 


vertigo, dysphagia, cough, dyspnea, abdominal pain, diarrhea, constipation, 


dysuria, hematuria





PMH/Family/Social


Past Medical History


Emphysema on albuterol inhaler


Medications





Current Medications


Vancomycin HCl 250 ml @  125 mls/hr ONCE  STAT IVPB  Last administered on 


6/3/19at 02:33; Admin Dose 125 MLS/HR;  Start 6/3/19 at 01:22;  Stop 6/3/19 at 


03:21


Coded Allergies:  


     No Known Drug Allergies (Verified  Allergy, Unknown, 2/28/18)





Past Surgical History


L hand surgery after trauma


basilar skull fracture s/p repair





Social History


Alcohol Use:  none


Smoking Status:  Current every day smoker (1-2 cigs per day )


Drug Use:  heroin (Including intra-muscular injection. )





Exam/Review of Systems


Vital Signs


Vitals





Vital Signs


  Date      Temp  Pulse  Resp  B/P (MAP)   Pulse Ox  O2          O2 Flow    FiO2


Time                                                 Delivery    Rate


    6/3/19           98    20      109/68        98  Room Air


     02:52                           (82)


    6/3/19  99.3


     02:00








Exam


Exam


Gen: Well developed man appearing younger than stated age in considerable 


discomfort. 


Eyes: PERRL, no icterus


HEENT: Moist mucous membranes, clear oropharynx


Neck:Supple, no lymphadenopathy


Card: Regular rate and rhythm, no murmurs


Pulm: Clear to auscultation bilaterally, mild expiratory wheezing


Abd: Soft, nontender, nondistended. No hepatosplenomegaly. 


Ext: L hip with very large erythematous, fluctuant, very tender region about 


15x15 cm. There is a smaller region about 10x10 cm also with fluctuance and 


tenderness inferior, about on the L buttocks. R buttocks has an old crusted 


wound without fluctuance or tenderness. 


Skin: warm, dry, well perfused.


: Good rectal tone.











KAREN BLACK MD               Jose 3, 2019 03:11

## 2019-06-03 NOTE — CONS
Assessment/Plan


Assessment/Plan


Hospital Course (Demo Recall)


This is a 38-year-old male with history of heroin abuse with a very large 


abscess over his left superior buttock starting to involve areas above the iliac


crest.  The patient clearly does need an irrigation debridement of this abscess.


 Currently there is no joint or bone involvement.  I did speak to general 


surgery about this case and they will evaluate it as I believe the location of 


his abscess would be best treated by their expertise.  We will be in further 


communication to determine the care of this patient.





If the patient can tolerate a CT scan with and without contrast that may be 


helpful in further characterizing the abscess especially the depth.





Patient should be kept n.p.o.


Continue IV antibiotics vancomycin and Zosyn.





Consultation Date/Type/Reason


Admit Date/Time


3 Shayy 2019


Date of Consultation:  Jose 3, 2019


Reason for Consultation


"Left Hip abscess"


Date/Time of Note


DATE: 6/3/19 


TIME: 08:13





Hx of Present Illness


This is a 38-year-old male with history of IV drug abuse.  He presented to the 


emergency department at [facility] early this morning for increasing 


pain in his left buttock and side.  He was found to have a very large abscess.  


Patient does admit to injecting heroin into his gluteus and flank.  The symptoms


started about 2 weeks ago and has significantly worsened in the last 5 to 6 


days.  He has had fevers and chills.  Denies numbness and tingling.  Denies any 


bowel or bladder incontinence.  Denies any groin pain or pain in his hip with 


motion of his leg.  The pain is isolated right over the abscess.  The patient 


was started on vancomycin and Zosyn.


Patient denies shortness of breath, chest pain, nausea/vomiting, constipation, 


diarrhea, numbness, and tingling.





Past Medical History


Anxiety/depression


Home Meds


Active Scripts


Amoxicillin* (Amoxicillin*) 500 Mg Cap, 500 MG PO BID for 7 Days, CAP


   Prov:ARNAV ROTHMAN         2/28/18


Ibuprofen* (Motrin*) 600 Mg Tab, 600 MG PO Q6, #30 TAB


   Prov:ARNAV ROTHMAN         2/28/18


Medications





Current Medications


Sodium Chloride 1,000 ml @  75 mls/hr [id number] IV  Last administered on 6/3/19at 


03:27; Admin Dose 75 MLS/HR;  Start 6/3/19 at 02:34


IV Flush (NS 3 ml) 3 ml PER PROTOCOL IV ;  Start 6/3/19 at 03:00


Ondansetron HCl (Zofran Inj) 4 mg Q6H  PRN IV NAUSEA/VOMITING;  Start 6/3/19 at 


03:00


Morphine Sulfate (morphine) 2 mg Q4H  PRN IV .SEVERE PAIN 7-10 Last administered


on 6/3/19at 04:19; Admin Dose 2 MG;  Start 6/3/19 at 03:00


Piperacillin Sod/ Tazobactam Sod 100 ml @  200 mls/hr Q6 IVPB  Last administered


on 6/3/19at 05:48; Admin Dose 200 MLS/HR;  Start 6/3/19 at 06:00


Vancomycin HCl (Vanco Iv Per Pharmacy) VANCOMYCIN PER PHARMACY PER PROTOCOL XX ;


 Start 6/3/19 at 03:30


Albuterol/ Ipratropium (Duoneb) 3 ml Q4H RESP THERAPY  PRN HHN SHORTNESS OF B


REATH;  Start 6/3/19 at 04:00


Vancomycin HCl 1.25 gm/Sodium Chloride 250 ml @  83.333 mls/ hr Q12H IVPB ;  


Start 6/3/19 at 14:00


Morphine Sulfate (morphine) 6 mg Q4H  PRN IV SEVERE PAIN LEVEL 7-10 Last 


administered on 6/3/19at 05:47; Admin Dose 6 MG;  Start 6/3/19 at 05:30


Allergies:  


Coded Allergies:  


     No Known Drug Allergies (Verified  Allergy, Unknown, 2/28/18)





Past Surgical History


Hand surgery 2005





Social History


Alcohol Use:  none


Smoking Status:  Current every day smoker (1-2 cigs per day )


Drug Use:  heroin (Including intra-muscular injection. )





Exam/Review of Systems


Exam


Vitals





Vital Signs


  Date      Temp  Pulse  Resp  B/P (MAP)   Pulse Ox  O2          O2 Flow    FiO2


Time                                                 Delivery    Rate


    6/3/19  99.2     90    18      105/58        94


     08:00                           (74)


    6/3/19                                           Room Air


     02:52








Intake and Output





6/2/19


6/2/19


6/3/19





1515:00


23:00


07:00





IntakeIntake Total


463 ml





OutputOutput Total


300 ml





BalanceBalance


163 ml











Exam


General: Awake, alert, patient in moderate distress secondary to pain.  Patient 


is cooperative.


Heart: regular rhythm


Lungs: breathing comfortably, no tachypnea or dyspnea





Musculoskeletal:





Over the left superior buttock going into the flank the patient has a very large


subcutaneous and likely also intramuscular abscess.  It is at least 15 cm.  T


here is no active drainage however there are purulent bulla over the abscess.  


There is significant erythema, warmth, tenderness to palpation.  There is no hip


or groin pain with range of motion of the hip.  Remainder of the extremity shows


no other signs of infection.





Sensation intact to light touch in a sural, saphenous, deep peroneal, 


superficial peroneal, medial and lateral plantar nerve distribution. Motor is 


intact, patient able to dorsiflex and plantarflex ankle and extend and flex 


great toe. Dorsalis Pedis pulse +2, Brisk capillary refill.  Compartments are 


soft.  Calves non-tender to palpation bilaterally.





Results


Result Diagram:  


6/3/19 0028                                                                     


          6/3/19 0028





Results 24hrs





Laboratory Tests


Test
                     6/3/19
00:20  6/3/19
00:28  6/3/19
02:20  6/3/19
04:20


POC Venous Lactate             2.2  *H


White Blood Count                            21.2  H


Red Blood Count                               4.84


Hemoglobin                                    14.8


Hematocrit                                    42.4


Mean Corpuscular Volume                       87.6


Mean Corpuscular                              30.6


Hemoglobin


Mean Corpuscular          
                  34.9  
  
             



Hemoglobin
Concent


Red Cell Distribution                         12.0


Width


Platelet Count                                453  H


Mean Platelet Volume                           8.3


Immature Granulocytes %                     0.700  H


Neutrophils %                                86.9  H


Lymphocytes %                                 5.6  L


Monocytes %                                    6.5


Eosinophils %                                  0.0


Basophils %                                    0.3


Nucleated Red Blood                            0.0


Cells %


Immature Granulocytes #                     0.140  H


Neutrophils #                                18.4  H


Lymphocytes #                                  1.2


Monocytes #                                   1.4  H


Eosinophils #                                  0.0


Basophils #                                    0.1


Nucleated Red Blood                            0.0


Cells #


Prothrombin Time                             15.1  H


Prothrombin Time Ratio                         1.2


INR International         
                  1.18  
  
             



Normalized
Ratio


Activated                 
                  33.5  
  
             



Partial
Thromboplast


Time


Sodium Level                                  134  L


Potassium Level                                4.1


Chloride Level                                 90  L


Carbon Dioxide Level                            31


Anion Gap                                       13


Blood Urea Nitrogen                              9


Creatinine                                    0.75


Est Glomerular Filtrat    
             > 60  
       
             



Rate
mL/min


Glucose Level                                  124


Calcium Level                                  9.7


Total Bilirubin                                0.9


Direct Bilirubin                              0.00


Indirect Bilirubin                             0.9


Aspartate Amino           
                    40  
  
             



Transf
(AST/SGOT)


Alanine                   
                    37  
  
             



Aminotransferase
(ALT/SG


PT)


Alkaline Phosphatase                          182  H


Troponin I                              < 0.012


Total Protein                                 8.5  H


Albumin                                        4.1


Globulin                                     4.40  H


Albumin/Globulin Ratio                        0.93


Lipase                                          59


Lactic Acid Level                                            1.0         2.6  *H








Imaging


Imaging


2 views of the left hip were obtained in the emergency department.  Demonstrate 


significant soft tissue swelling of the left buttock with subcutaneous air at 


the level of iliac crest.  No acute bony abnormality.  No fracture.





Medications


Medication





Current Medications


Sodium Chloride 1,000 ml @  75 mls/hr [id number] IV  Last administered on 6/3/19at 


03:27; Admin Dose 75 MLS/HR;  Start 6/3/19 at 02:34


IV Flush (NS 3 ml) 3 ml PER PROTOCOL IV ;  Start 6/3/19 at 03:00


Ondansetron HCl (Zofran Inj) 4 mg Q6H  PRN IV NAUSEA/VOMITING;  Start 6/3/19 at 


03:00


Morphine Sulfate (morphine) 2 mg Q4H  PRN IV .SEVERE PAIN 7-10 Last administered


on 6/3/19at 04:19; Admin Dose 2 MG;  Start 6/3/19 at 03:00


Piperacillin Sod/ Tazobactam Sod 100 ml @  200 mls/hr Q6 IVPB  Last administered


on 6/3/19at 05:48; Admin Dose 200 MLS/HR;  Start 6/3/19 at 06:00


Vancomycin HCl (Vanco Iv Per Pharmacy) VANCOMYCIN PER PHARMACY PER PROTOCOL XX ;


 Start 6/3/19 at 03:30


Albuterol/ Ipratropium (Duoneb) 3 ml Q4H RESP THERAPY  PRN HHN SHORTNESS OF 


BREATH;  Start 6/3/19 at 04:00


Vancomycin HCl 1.25 gm/Sodium Chloride 250 ml @  83.333 mls/ hr Q12H IVPB ;  


Start 6/3/19 at 14:00


Morphine Sulfate (morphine) 6 mg Q4H  PRN IV SEVERE PAIN LEVEL 7-10 Last 


administered on 6/3/19at 05:47; Admin Dose 6 MG;  Start 6/3/19 at 05:30











OSIEL JAIMES MD                Jose 3, 2019 08:23

## 2019-06-03 NOTE — PREAC
Date/Time of Note


Date/Time of Note


DATE: 6/3/19 


TIME: 16:58





Anesthesia Eval and Record


Evaluation


Time Pre-Procedure Interview


DATE: 6/3/19 


TIME: 16:58


Age


38


Sex


male


NPO:  8 hrs


Preoperative diagnosis


 L hip abscess


Planned procedure


I & D of left hip





Past Medical History


Past Medical History:  Includes


Pulm:  Smoking Hx, Asthma, Other (emphysema)


Recreational drugs:  Other (heroin used 4 days ago)





Surgery & Anesthesia Issues


No known issue





Meds


Anticoagulation:  No


Beta Blocker within 24 hr:  No


Reason Beta Blocker not given:  Pt. not on B-Blocker


Active Scripts


Amoxicillin* (Amoxicillin*) 500 Mg Cap, 500 MG PO BID for 7 Days, CAP


   Prov:EVENS ROTHMANNA C         2/28/18


Ibuprofen* (Motrin*) 600 Mg Tab, 600 MG PO Q6, #30 TAB


   Prov:LORNAARNAV C         2/28/18





Current Medications


Sodium Chloride 1,000 ml @  75 mls/hr [id number] IV  Last administered on 6/3/19at 


03:27; Admin Dose 75 MLS/HR;  Start 6/3/19 at 02:34


IV Flush (NS 3 ml) 3 ml PER PROTOCOL IV ;  Start 6/3/19 at 03:00


Ondansetron HCl (Zofran Inj) 4 mg Q6H  PRN IV NAUSEA/VOMITING;  Start 6/3/19 at 


03:00


Morphine Sulfate (morphine) 2 mg Q4H  PRN IV .SEVERE PAIN 7-10 Last administered


on 6/3/19at 04:19; Admin Dose 2 MG;  Start 6/3/19 at 03:00


Piperacillin Sod/ Tazobactam Sod 100 ml @  200 mls/hr Q6 IVPB  Last administered


on 6/3/19at 13:09; Admin Dose 200 MLS/HR;  Start 6/3/19 at 06:00


Vancomycin HCl (Vanco Iv Per Pharmacy) VANCOMYCIN PER PHARMACY PER PROTOCOL XX ;


 Start 6/3/19 at 03:30


Albuterol/ Ipratropium (Duoneb) 3 ml Q4H RESP THERAPY  PRN HHN SHORTNESS OF 


BREATH;  Start 6/3/19 at 04:00


Vancomycin HCl 1.25 gm/Sodium Chloride 250 ml @  83.333 mls/ hr Q12H IVPB  Last 


administered on 6/3/19at 13:59; Admin Dose 83.333 MLS/HR;  Start 6/3/19 at 14:00


Morphine Sulfate (morphine) 6 mg Q4H  PRN IV SEVERE PAIN LEVEL 7-10 Last 


administered on 6/3/19at 13:57; Admin Dose 6 MG;  Start 6/3/19 at 05:30


Miscellaneous Information (*Rx Drug Level Order Reminder*) 1 1300  ONCE XX ;  


Start 6/4/19 at 13:00;  Stop 6/4/19 at 13:01


Meds reviewed:  Yes





Allergies


Coded Allergies:  


     No Known Drug Allergies (Verified  Allergy, Unknown, 2/28/18)


Allergies Reviewed:  Yes





Labs/Studies


Labs Reviewed:  Reviewed by anesthesiologist


Result Diagram:  


6/3/19 0028                                                                     


          6/3/19 0028





Laboratory Tests


6/3/19 00:28








Pregnancy test:  N/A





Pre-procedure Exam


Last vitals





Vital Signs


  Date      Temp  Pulse  Resp  B/P (MAP)   Pulse Ox  O2          O2 Flow    FiO2


Time                                                 Delivery    Rate


    6/3/19  99.2     80    16  98/60 (73)        92


     14:50


    6/3/19                                           Room Air


     02:52





Airway:  Adequate thyromental dist


Mallampati:  Mallampati II


Teeth:  Normal


Lung:  Normal


Heart:  Normal





ASA Physical Status


ASA physical status:  3


Emergency:  None





Pre-operative Attestations


Prior to commencing anesthesia and surgery, the patient was re-evaluated, there 


was verification of:


*The patient's identity


*The results of appropriate recent lab work and preoperative vital signs


*The above evaluation not changing prior to induction


*Anesthetic plan, risk benefits, alternative and complications discussed with p


atient/family; questions answered; patient/family understands, accepts and 


wishes to proceed.











NAKUL POLLARD                   Jose 3, 2019 17:00

## 2019-06-04 VITALS — SYSTOLIC BLOOD PRESSURE: 112 MMHG | HEART RATE: 99 BPM | DIASTOLIC BLOOD PRESSURE: 59 MMHG | RESPIRATION RATE: 18 BRPM

## 2019-06-04 VITALS — SYSTOLIC BLOOD PRESSURE: 94 MMHG | RESPIRATION RATE: 20 BRPM | HEART RATE: 97 BPM | DIASTOLIC BLOOD PRESSURE: 53 MMHG

## 2019-06-04 VITALS — RESPIRATION RATE: 18 BRPM | HEART RATE: 92 BPM | SYSTOLIC BLOOD PRESSURE: 112 MMHG | DIASTOLIC BLOOD PRESSURE: 68 MMHG

## 2019-06-04 VITALS — HEART RATE: 89 BPM | RESPIRATION RATE: 16 BRPM | SYSTOLIC BLOOD PRESSURE: 108 MMHG | DIASTOLIC BLOOD PRESSURE: 62 MMHG

## 2019-06-04 LAB
ADD MAN DIFF?: NO
ALANINE AMINOTRANSFERASE: 27 IU/L (ref 13–69)
ALBUMIN/GLOBULIN RATIO: 0.87
ALBUMIN: 2.7 G/DL (ref 3.3–4.9)
ALKALINE PHOSPHATASE: 96 IU/L (ref 42–121)
ANION GAP: 8 (ref 5–13)
ASPARTATE AMINO TRANSFERASE: 27 IU/L (ref 15–46)
BASOPHIL #: 0 10^3/UL (ref 0–0.1)
BASOPHILS %: 0.3 % (ref 0–2)
BILIRUBIN,DIRECT: 0 MG/DL (ref 0–0.2)
BILIRUBIN,TOTAL: 0.3 MG/DL (ref 0.2–1.3)
BLOOD UREA NITROGEN: 14 MG/DL (ref 7–20)
CALCIUM: 7.8 MG/DL (ref 8.4–10.2)
CARBON DIOXIDE: 29 MMOL/L (ref 21–31)
CHLORIDE: 99 MMOL/L (ref 97–110)
CREATININE: 0.88 MG/DL (ref 0.61–1.24)
EOSINOPHILS #: 0.1 10^3/UL (ref 0–0.5)
EOSINOPHILS %: 0.4 % (ref 0–7)
GLOBULIN: 3.1 G/DL (ref 1.3–3.2)
GLUCOSE: 86 MG/DL (ref 70–220)
HEMATOCRIT: 33.2 % (ref 42–52)
HEMOGLOBIN A1C: 5.7 % (ref 0–5.9)
HEMOGLOBIN: 11 G/DL (ref 14–18)
IMMATURE GRANS #M: 0.09 10^3/UL (ref 0–0.03)
IMMATURE GRANS % (M): 0.6 % (ref 0–0.43)
LYMPHOCYTES #: 1.6 10^3/UL (ref 0.8–2.9)
LYMPHOCYTES %: 11.1 % (ref 15–51)
MAGNESIUM: 1.8 MG/DL (ref 1.7–2.5)
MEAN CORPUSCULAR HEMOGLOBIN: 30.1 PG (ref 29–33)
MEAN CORPUSCULAR HGB CONC: 33.1 G/DL (ref 32–37)
MEAN CORPUSCULAR VOLUME: 90.7 FL (ref 82–101)
MEAN PLATELET VOLUME: 8.5 FL (ref 7.4–10.4)
MONOCYTE #: 1.3 10^3/UL (ref 0.3–0.9)
MONOCYTES %: 8.9 % (ref 0–11)
NEUTROPHIL #: 11.1 10^3/UL (ref 1.6–7.5)
NEUTROPHILS %: 78.7 % (ref 39–77)
NUCLEATED RED BLOOD CELLS #: 0 10^3/UL (ref 0–0)
NUCLEATED RED BLOOD CELLS%: 0 /100WBC (ref 0–0)
PHOSPHORUS: 3.8 MG/DL (ref 2.5–4.9)
PLATELET COUNT: 413 10^3/UL (ref 140–415)
POTASSIUM: 3.9 MMOL/L (ref 3.5–5.1)
RED BLOOD COUNT: 3.66 10^6/UL (ref 4.7–6.1)
RED CELL DISTRIBUTION WIDTH: 12.7 % (ref 11.5–14.5)
SODIUM: 136 MMOL/L (ref 135–144)
THYROID STIMULATING HORMONE: 0.54 MIU/L (ref 0.47–4.68)
TOTAL PROTEIN: 5.8 G/DL (ref 6.1–8.1)
VANCOMYCIN,TROUGH: 5.8 UG/ML (ref 10–20)
WHITE BLOOD COUNT: 14.1 10^3/UL (ref 4.8–10.8)

## 2019-06-04 RX ADMIN — MORPHINE SULFATE PRN MG: 2 INJECTION, SOLUTION INTRAMUSCULAR; INTRAVENOUS at 14:44

## 2019-06-04 RX ADMIN — MORPHINE SULFATE PRN MG: 2 INJECTION, SOLUTION INTRAMUSCULAR; INTRAVENOUS at 02:40

## 2019-06-04 RX ADMIN — SODIUM CHLORIDE SCH MLS/HR: 0.9 INJECTION, SOLUTION INTRAVENOUS at 02:40

## 2019-06-04 RX ADMIN — ASCORBIC ACID, VITAMIN A PALMITATE, CHOLECALCIFEROL, THIAMINE HYDROCHLORIDE, RIBOFLAVIN-5 PHOSPHATE SODIUM, PYRIDOXINE HYDROCHLORIDE, NIACINAMIDE, DEXPANTHENOL, ALPHA-TOCOPHEROL ACETATE, VITAMIN K1, FOLIC ACID, BIOTIN, CYANOCOBALAMIN SCH MLS/HR: 200; 3300; 200; 6; 3.6; 6; 40; 15; 10; 150; 600; 60; 5 INJECTION, SOLUTION INTRAVENOUS at 20:21

## 2019-06-04 RX ADMIN — MORPHINE SULFATE PRN MG: 2 INJECTION, SOLUTION INTRAMUSCULAR; INTRAVENOUS at 05:06

## 2019-06-04 RX ADMIN — LORAZEPAM 1 MG: 2 INJECTION, SOLUTION INTRAMUSCULAR; INTRAVENOUS at 18:40

## 2019-06-04 RX ADMIN — PIPERACILLIN SODIUM AND TAZOBACTAM SODIUM SCH MLS/HR: 3; .375 INJECTION, POWDER, LYOPHILIZED, FOR SOLUTION INTRAVENOUS at 00:38

## 2019-06-04 RX ADMIN — VANCOMYCIN HYDROCHLORIDE 1 MLS/HR: 500 INJECTION, POWDER, LYOPHILIZED, FOR SOLUTION INTRAVENOUS at 14:17

## 2019-06-04 RX ADMIN — MORPHINE SULFATE 1 MG: 2 INJECTION, SOLUTION INTRAMUSCULAR; INTRAVENOUS at 23:10

## 2019-06-04 RX ADMIN — MORPHINE SULFATE PRN MG: 2 INJECTION, SOLUTION INTRAMUSCULAR; INTRAVENOUS at 23:10

## 2019-06-04 RX ADMIN — ASCORBIC ACID, VITAMIN A PALMITATE, CHOLECALCIFEROL, THIAMINE HYDROCHLORIDE, RIBOFLAVIN-5 PHOSPHATE SODIUM, PYRIDOXINE HYDROCHLORIDE, NIACINAMIDE, DEXPANTHENOL, ALPHA-TOCOPHEROL ACETATE, VITAMIN K1, FOLIC ACID, BIOTIN, CYANOCOBALAMIN 1 MLS/HR: 200; 3300; 200; 6; 3.6; 6; 40; 15; 10; 150; 600; 60; 5 INJECTION, SOLUTION INTRAVENOUS at 18:20

## 2019-06-04 RX ADMIN — MORPHINE SULFATE PRN MG: 2 INJECTION, SOLUTION INTRAMUSCULAR; INTRAVENOUS at 12:39

## 2019-06-04 RX ADMIN — MORPHINE SULFATE PRN MG: 2 INJECTION, SOLUTION INTRAMUSCULAR; INTRAVENOUS at 00:39

## 2019-06-04 RX ADMIN — MORPHINE SULFATE 1 MG: 2 INJECTION, SOLUTION INTRAMUSCULAR; INTRAVENOUS at 02:40

## 2019-06-04 RX ADMIN — PIPERACILLIN SODIUM AND TAZOBACTAM SODIUM 1 MLS/HR: 3; .375 INJECTION, POWDER, LYOPHILIZED, FOR SOLUTION INTRAVENOUS at 00:38

## 2019-06-04 RX ADMIN — MORPHINE SULFATE PRN MG: 2 INJECTION, SOLUTION INTRAMUSCULAR; INTRAVENOUS at 09:08

## 2019-06-04 RX ADMIN — MORPHINE SULFATE 1 MG: 2 INJECTION, SOLUTION INTRAMUSCULAR; INTRAVENOUS at 12:39

## 2019-06-04 RX ADMIN — ASCORBIC ACID, VITAMIN A PALMITATE, CHOLECALCIFEROL, THIAMINE HYDROCHLORIDE, RIBOFLAVIN-5 PHOSPHATE SODIUM, PYRIDOXINE HYDROCHLORIDE, NIACINAMIDE, DEXPANTHENOL, ALPHA-TOCOPHEROL ACETATE, VITAMIN K1, FOLIC ACID, BIOTIN, CYANOCOBALAMIN SCH MLS/HR: 200; 3300; 200; 6; 3.6; 6; 40; 15; 10; 150; 600; 60; 5 INJECTION, SOLUTION INTRAVENOUS at 18:20

## 2019-06-04 RX ADMIN — PIPERACILLIN SODIUM AND TAZOBACTAM SODIUM 1 MLS/HR: 3; .375 INJECTION, POWDER, LYOPHILIZED, FOR SOLUTION INTRAVENOUS at 12:38

## 2019-06-04 RX ADMIN — ASCORBIC ACID, VITAMIN A PALMITATE, CHOLECALCIFEROL, THIAMINE HYDROCHLORIDE, RIBOFLAVIN-5 PHOSPHATE SODIUM, PYRIDOXINE HYDROCHLORIDE, NIACINAMIDE, DEXPANTHENOL, ALPHA-TOCOPHEROL ACETATE, VITAMIN K1, FOLIC ACID, BIOTIN, CYANOCOBALAMIN 1 MLS/HR: 200; 3300; 200; 6; 3.6; 6; 40; 15; 10; 150; 600; 60; 5 INJECTION, SOLUTION INTRAVENOUS at 06:09

## 2019-06-04 RX ADMIN — VANCOMYCIN HYDROCHLORIDE SCH MLS/HR: 1 INJECTION, POWDER, LYOPHILIZED, FOR SOLUTION INTRAVENOUS at 21:04

## 2019-06-04 RX ADMIN — PIPERACILLIN SODIUM AND TAZOBACTAM SODIUM 1 MLS/HR: 3; .375 INJECTION, POWDER, LYOPHILIZED, FOR SOLUTION INTRAVENOUS at 06:07

## 2019-06-04 RX ADMIN — ASCORBIC ACID, VITAMIN A PALMITATE, CHOLECALCIFEROL, THIAMINE HYDROCHLORIDE, RIBOFLAVIN-5 PHOSPHATE SODIUM, PYRIDOXINE HYDROCHLORIDE, NIACINAMIDE, DEXPANTHENOL, ALPHA-TOCOPHEROL ACETATE, VITAMIN K1, FOLIC ACID, BIOTIN, CYANOCOBALAMIN SCH MLS/HR: 200; 3300; 200; 6; 3.6; 6; 40; 15; 10; 150; 600; 60; 5 INJECTION, SOLUTION INTRAVENOUS at 06:09

## 2019-06-04 RX ADMIN — MORPHINE SULFATE 1 MG: 2 INJECTION, SOLUTION INTRAMUSCULAR; INTRAVENOUS at 05:06

## 2019-06-04 RX ADMIN — MORPHINE SULFATE PRN MG: 2 INJECTION, SOLUTION INTRAMUSCULAR; INTRAVENOUS at 17:18

## 2019-06-04 RX ADMIN — SODIUM CHLORIDE SCH MLS/HR: 0.9 INJECTION, SOLUTION INTRAVENOUS at 14:17

## 2019-06-04 RX ADMIN — PIPERACILLIN SODIUM AND TAZOBACTAM SODIUM SCH MLS/HR: 3; .375 INJECTION, POWDER, LYOPHILIZED, FOR SOLUTION INTRAVENOUS at 06:07

## 2019-06-04 RX ADMIN — LORAZEPAM PRN MG: 2 INJECTION, SOLUTION INTRAMUSCULAR; INTRAVENOUS at 18:40

## 2019-06-04 RX ADMIN — ASCORBIC ACID, VITAMIN A PALMITATE, CHOLECALCIFEROL, THIAMINE HYDROCHLORIDE, RIBOFLAVIN-5 PHOSPHATE SODIUM, PYRIDOXINE HYDROCHLORIDE, NIACINAMIDE, DEXPANTHENOL, ALPHA-TOCOPHEROL ACETATE, VITAMIN K1, FOLIC ACID, BIOTIN, CYANOCOBALAMIN 1 MLS/HR: 200; 3300; 200; 6; 3.6; 6; 40; 15; 10; 150; 600; 60; 5 INJECTION, SOLUTION INTRAVENOUS at 20:21

## 2019-06-04 RX ADMIN — VANCOMYCIN HYDROCHLORIDE 1 MLS/HR: 1 INJECTION, POWDER, LYOPHILIZED, FOR SOLUTION INTRAVENOUS at 21:04

## 2019-06-04 RX ADMIN — MORPHINE SULFATE 1 MG: 2 INJECTION, SOLUTION INTRAMUSCULAR; INTRAVENOUS at 17:18

## 2019-06-04 RX ADMIN — ENOXAPARIN SODIUM SCH MG: 100 INJECTION SUBCUTANEOUS at 06:06

## 2019-06-04 RX ADMIN — VANCOMYCIN HYDROCHLORIDE 1 MLS/HR: 500 INJECTION, POWDER, LYOPHILIZED, FOR SOLUTION INTRAVENOUS at 02:40

## 2019-06-04 RX ADMIN — MORPHINE SULFATE 1 MG: 2 INJECTION, SOLUTION INTRAMUSCULAR; INTRAVENOUS at 14:44

## 2019-06-04 RX ADMIN — PIPERACILLIN SODIUM AND TAZOBACTAM SODIUM 1 MLS/HR: 3; .375 INJECTION, POWDER, LYOPHILIZED, FOR SOLUTION INTRAVENOUS at 17:54

## 2019-06-04 RX ADMIN — MORPHINE SULFATE PRN MG: 2 INJECTION, SOLUTION INTRAMUSCULAR; INTRAVENOUS at 07:06

## 2019-06-04 RX ADMIN — ENOXAPARIN SODIUM 1 MG: 100 INJECTION SUBCUTANEOUS at 06:06

## 2019-06-04 RX ADMIN — MORPHINE SULFATE 1 MG: 2 INJECTION, SOLUTION INTRAMUSCULAR; INTRAVENOUS at 00:39

## 2019-06-04 RX ADMIN — PIPERACILLIN SODIUM AND TAZOBACTAM SODIUM SCH MLS/HR: 3; .375 INJECTION, POWDER, LYOPHILIZED, FOR SOLUTION INTRAVENOUS at 17:54

## 2019-06-04 RX ADMIN — MORPHINE SULFATE 1 MG: 2 INJECTION, SOLUTION INTRAMUSCULAR; INTRAVENOUS at 07:06

## 2019-06-04 RX ADMIN — MORPHINE SULFATE 1 MG: 2 INJECTION, SOLUTION INTRAMUSCULAR; INTRAVENOUS at 09:08

## 2019-06-04 RX ADMIN — HYDROCODONE BITARTRATE AND ACETAMINOPHEN 1 TAB: 10; 325 TABLET ORAL at 18:18

## 2019-06-04 RX ADMIN — PIPERACILLIN SODIUM AND TAZOBACTAM SODIUM SCH MLS/HR: 3; .375 INJECTION, POWDER, LYOPHILIZED, FOR SOLUTION INTRAVENOUS at 12:38

## 2019-06-04 NOTE — PN
Date/Time of Note


Date/Time of Note


DATE: 6/4/19 


TIME: 14:01





Assessment/Plan


VTE Prophylaxis


Risk score (from Ns)>0 risk:  2


SCD applied (from Ns):  Yes


Pharmacological prophylaxis:  NA/contraindicated


Pharm contraindication:  low risk/ambulating





Lines/Catheters


IV Catheter Type (from [facility]):  Peripheral IV





Assessment/Plan


Hospital Course


39 yo heroin addict presents with L hip abscess from intramuscular injection.





#Left hip abscess


-CT is consistent with abscess


-Status post surgical drainage with surgery


- IV vanco, zosyn


- Followup cultures


-ID consultation obtained


-Wound care





#Asthma


- Albuterol prn





#Heroin use


- Patient is motivated to quit, social work consult appreciated, patient will be


placed in a drug rehab program after DC





DVT: SCDs


GI: None





DC planning: Anticipate DC to drug rehab program in the coming days


Result Diagram:  


6/4/19 0505                                                                     


          6/4/19 0505





Results 24hrs





Laboratory Tests


               Test
                                6/4/19
05:05


               White Blood Count                        14.1  #H


               Red Blood Count                          3.66  #L


               Hemoglobin                               11.0  #L


               Hematocrit                               33.2  #L


               Mean Corpuscular Volume                    90.7


               Mean Corpuscular Hemoglobin                30.1


               Mean Corpuscular Hemoglobin
Concent       33.1  



               Red Cell Distribution Width                12.7


               Platelet Count                              413


               Mean Platelet Volume                        8.5


               Immature Granulocytes %                  0.600  H


               Neutrophils %                             78.7  H


               Lymphocytes %                             11.1  L


               Monocytes %                                 8.9


               Eosinophils %                               0.4


               Basophils %                                 0.3


               Nucleated Red Blood Cells %                 0.0


               Immature Granulocytes #                  0.090  H


               Neutrophils #                             11.1  H


               Lymphocytes #                               1.6


               Monocytes #                                1.3  H


               Eosinophils #                               0.1


               Basophils #                                 0.0


               Nucleated Red Blood Cells #                 0.0


               Sodium Level                                136


               Potassium Level                             3.9


               Chloride Level                               99


               Carbon Dioxide Level                         29


               Anion Gap                                     8


               Blood Urea Nitrogen                          14


               Creatinine                                 0.88


               Est Glomerular Filtrat Rate
mL/min   > 60  



               Glucose Level                                86


               Hemoglobin A1c                              5.7


               Calcium Level                              7.8  L


               Phosphorus Level                            3.8


               Magnesium Level                             1.8


               Total Bilirubin                             0.3


               Direct Bilirubin                           0.00


               Indirect Bilirubin                          0.3


               Aspartate Amino Transf
(AST/SGOT)           27  



               Alanine Aminotransferase
(ALT/SGPT)         27  



               Alkaline Phosphatase                         96


               Total Protein                             5.8  #L


               Albumin                                   2.7  #L


               Globulin                                   3.10


               Albumin/Globulin Ratio                     0.87


               Thyroid Stimulating Hormone
(TSH)        0.541  









Subjective


24 Hr Interval Summary


Musculoskeletal:  bone/joint pain





Exam/Review of Systems


Exam


Vitals





Vital Signs


  Date      Temp  Pulse  Resp  B/P (MAP)   Pulse Ox  O2          O2 Flow    FiO2


Time                                                 Delivery    Rate


    6/4/19  98.0     97    20  94/53 (67)        97


     08:08


    6/3/19                                           Room Air


     19:52


    6/3/19                                                             6.0


     18:37








Intake and Output





6/3/19


6/3/19


6/4/19





1515:00


23:00


07:00





IntakeIntake Total


100 ml


1767 ml


2570 ml





OutputOutput Total


665 ml


1550 ml





BalanceBalance


100 ml


1102 ml


1020 ml











Constitutional:  alert, oriented


Respiratory:  clear to auscultation


Cardiovascular:  regular rate and rhythm


Gastrointestinal:  soft; 


   No distended


Musculoskeletal:  nl extremities to inspection





Results


Results 24hrs





Laboratory Tests


               Test
                                6/4/19
05:05


               White Blood Count                        14.1  #H


               Red Blood Count                          3.66  #L


               Hemoglobin                               11.0  #L


               Hematocrit                               33.2  #L


               Mean Corpuscular Volume                    90.7


               Mean Corpuscular Hemoglobin                30.1


               Mean Corpuscular Hemoglobin
Concent       33.1  



               Red Cell Distribution Width                12.7


               Platelet Count                              413


               Mean Platelet Volume                        8.5


               Immature Granulocytes %                  0.600  H


               Neutrophils %                             78.7  H


               Lymphocytes %                             11.1  L


               Monocytes %                                 8.9


               Eosinophils %                               0.4


               Basophils %                                 0.3


               Nucleated Red Blood Cells %                 0.0


               Immature Granulocytes #                  0.090  H


               Neutrophils #                             11.1  H


               Lymphocytes #                               1.6


               Monocytes #                                1.3  H


               Eosinophils #                               0.1


               Basophils #                                 0.0


               Nucleated Red Blood Cells #                 0.0


               Sodium Level                                136


               Potassium Level                             3.9


               Chloride Level                               99


               Carbon Dioxide Level                         29


               Anion Gap                                     8


               Blood Urea Nitrogen                          14


               Creatinine                                 0.88


               Est Glomerular Filtrat Rate
mL/min   > 60  



               Glucose Level                                86


               Hemoglobin A1c                              5.7


               Calcium Level                              7.8  L


               Phosphorus Level                            3.8


               Magnesium Level                             1.8


               Total Bilirubin                             0.3


               Direct Bilirubin                           0.00


               Indirect Bilirubin                          0.3


               Aspartate Amino Transf
(AST/SGOT)           27  



               Alanine Aminotransferase
(ALT/SGPT)         27  



               Alkaline Phosphatase                         96


               Total Protein                             5.8  #L


               Albumin                                   2.7  #L


               Globulin                                   3.10


               Albumin/Globulin Ratio                     0.87


               Thyroid Stimulating Hormone
(TSH)        0.541  









Medications


Medication





Current Medications


IV Flush (NS 3 ml) 3 ml PER PROTOCOL IV ;  Start 6/3/19 at 03:00


Piperacillin Sod/ Tazobactam Sod 100 ml @  200 mls/hr Q6 IVPB  Last administered


on 6/4/19at 12:38; Admin Dose 200 MLS/HR;  Start 6/3/19 at 06:00


Vancomycin HCl (Vanco Iv Per Pharmacy) VANCOMYCIN PER PHARMACY PER PROTOCOL XX ;


 Start 6/3/19 at 03:30


Albuterol/ Ipratropium (Duoneb) 3 ml Q4H RESP THERAPY  PRN HHN SHORTNESS OF 


BREATH;  Start 6/3/19 at 04:00


Vancomycin HCl 1.25 gm/Sodium Chloride 250 ml @  83.333 mls/ hr Q12H IVPB  Last 


administered on 6/4/19at 02:40; Admin Dose 83.333 MLS/HR;  Start 6/3/19 at 14:00


Morphine Sulfate (morphine) 2 mg Q2H  PRN IV PAIN LEVEL 6-10 Last administered 


on 6/4/19at 12:39; Admin Dose 2 MG;  Start 6/3/19 at 18:00


Acetaminophen/ Hydrocodone Bitart (Norco (10/325)) 1 tab Q6H  PRN PO PAIN;  


Start 6/3/19 at 18:00


Acetaminophen (Tylenol Tab) 650 mg Q6H  PRN PO MILD PAIN(1-3)OR ELEVATED TEMP;  


Start 6/3/19 at 18:00


Ondansetron HCl (Zofran Inj) 4 mg Q6H  PRN IV NAUSEA AND/OR VOMITING;  Start 


6/3/19 at 18:00


Potassium Chloride/Dextrose/ Sod Cl 1,000 ml @  75 mls/hr [id number] IV  Last 


administered on 6/3/19at 20:37; Admin Dose 75 MLS/HR;  Start 6/3/19 at 17:41


Enoxaparin Sodium (Lovenox) 40 mg DAILY@07 SC  Last administered on 6/4/19at 


06:06; Admin Dose 40 MG;  Start 6/4/19 at 07:00











KIMMY STOVALL                   Jun 4, 2019 14:04

## 2019-06-04 NOTE — CONS
DATE OF ADMISSION: 06/03/2019

DATE OF CONSULTATION:  06/04/2019

 

 

 

TYPE OF CONSULTATION:  Infectious disease.

 

REASON FOR CONSULTATION:  Antibiotic management.

 

HISTORY OF PRESENT ILLNESS:  Jim Pugh is a 38-year-old male who was admitted on 06/03/2019.  ALIS contreras has a history of heroin addiction and presents with left hip abscess from intramuscular injection. 
 He was in his usual state of health until 3 weeks ago when he was seen at [facility] for bronch
itis.  He had been injecting heroin into his left gluteus for a few weeks prior to his admission and 
noted was getting painful but declined to mention it.  He was discharged on amoxicillin for bronchiti
s and finished after course.  Over the last 3 weeks, his hip is growing more painful.  The patient us
ed insulin syringes to aspirate pus from the abscess.  He pulled out 150 mL.  Over past 3 days, he ha
s been lying on the ground, unable to walk due to the pain.  He had an episode of fecal and urinary i
ncontinence, called an ambulance and came to the emergency room.  His white count was 21.2, H and H 1
4.8 and 42.8, platelet count 453,000.  BUN and creatinine is 9/0.75.  Glucose was 124.

 

PAST MEDICAL HISTORY:  Positive for emphysema.  He is on albuterol inhaler.

 

PAST SURGICAL HISTORY:  Left hand surgery after trauma had a basilar skull fracture, status post repa
ir.

 

FAMILY HISTORY:  Noncontributory.

 

SOCIAL HISTORY:  He is a current every day smoker, 1 to 2 cigarettes per day.  He is a heroin abuser 
including intramuscular injections.  He does not drink.

 

HOSPITAL COURSE:  The patient was started on vancomycin and Zosyn.  He was seen by Dr. Aviles who noted
 very large abscess over his left superior buttocks starting to involve the areas iliac crest.  A CT 
scan of the abdomen and pelvis showed 2 walled off fluid collections with peripheral enhancement in t
he subcutaneous soft tissues overlying left gluteal region measuring 11.5 and 8.2 cm, likely represen
t an abscess, mild gaseous distention of multiple loops of small and large bowel likely suggestive of
 mild ileus, patchy ground glass opacities of the periphery of the right lower lobe as well as small 
areas of left lung base likely due to atelectasis versus an infectious or inflammatory process.  He w
as seen by Dr. Magdaleno and on 06/03/2019, he underwent incision and drainage of a left gluteal abscess
 which was drained and packed.  Pus was expressed.  Cultures were taken.  All the loculations were br
oken up.  Wound was irrigated and then packed with Kerlix.  The wound culture has no growth after 1 d
ay.  The patient is currently on vancomycin and Zosyn.

 

PHYSICAL EXAMINATION:

GENERAL:  He is alert, responsive, in no acute distress.

VITAL SIGNS:  Stable.  He is afebrile.

SKIN:  Without generalized rash.

HEENT:  Within normal limits.

NECK:  Supple.

LYMPH NODES:  None palpable.

CHEST:  Decreased breath sounds at the bases.

HEART:  Without murmur or gallop.

ABDOMEN:  Soft, nontender without organosplenomegaly or masses.

EXTREMITIES:  Left hip is bandaged at this point.  Right lower extremity without cyanosis, clubbing o
r edema.

RECTAL AND GENITAL:  Deferred.

NEUROLOGIC:  No focal neurological abnormality.

 

IMPRESSION AND PLAN:  The patient is a 38-year-old heroin addict status post drainage of a left hip a
bscess secondary to intramuscular injection of heroin.  We will continue him on vancomycin and Zosyn 
until we get the culture and sensitivities.  I will dictate my findings to the hospitalist and also t
o the aforementioned consults.

 

 

Dictated By: JERROLD DREYER MD JD/NTS

DD:    06/04/2019 15:19:26

DT:    06/04/2019 17:29:39

Conf#: [id number]

DID#:  [id number]

CC: KIMMY STOVALL MD; KAREN BLACK MD; OSIEL JAIMES MD;*End*

## 2019-06-04 NOTE — PN
Date/Time of Note


Date/Time of Note


DATE: 6/4/19 


TIME: 16:20





Assessment/Plan


Lines/Catheters


IV Catheter Type (from Nrsg):  Peripheral IV





Assessment/Plan


Assessment/Plan


Postop day #1 status I&D of left gluteal abscess


WBC resolving


Improving





Subjective


24 Hr Interval Summary


Subjective hx not possible:  other (Detoxing from heroin)


Constitutional:  improved


Feeding:  advancing diet


Pain Control:  moderate





Exam/Review of Systems


Vital Signs


Vitals





Vital Signs


  Date      Temp  Pulse  Resp  B/P (MAP)   Pulse Ox  O2          O2 Flow    FiO2


Time                                                 Delivery    Rate


    6/4/19  98.4     89    16      108/62        97


     14:29                           (77)


    6/3/19                                           Room Air


     19:52


    6/3/19                                                             6.0


     18:37








Intake and Output





6/3/19


6/3/19


6/4/19





1515:00


23:00


07:00





IntakeIntake Total


100 ml


1767 ml


2570 ml





OutputOutput Total


665 ml


1550 ml





BalanceBalance


100 ml


1102 ml


1020 ml














Exam


Constitutional:  alert, oriented, well developed


Additional Comments


Wound -dressing intact.  Change this morning.





Results


Result Diagram:  


6/4/19 0505                                                                     


          6/4/19 0505














WILLIS BARNES MD            Jun 4, 2019 16:21

## 2019-06-05 VITALS — RESPIRATION RATE: 19 BRPM | DIASTOLIC BLOOD PRESSURE: 71 MMHG | SYSTOLIC BLOOD PRESSURE: 119 MMHG | HEART RATE: 102 BPM

## 2019-06-05 VITALS — DIASTOLIC BLOOD PRESSURE: 79 MMHG | SYSTOLIC BLOOD PRESSURE: 120 MMHG | RESPIRATION RATE: 19 BRPM | HEART RATE: 100 BPM

## 2019-06-05 VITALS — HEART RATE: 79 BPM | RESPIRATION RATE: 19 BRPM | SYSTOLIC BLOOD PRESSURE: 125 MMHG | DIASTOLIC BLOOD PRESSURE: 69 MMHG

## 2019-06-05 VITALS — DIASTOLIC BLOOD PRESSURE: 74 MMHG | SYSTOLIC BLOOD PRESSURE: 117 MMHG | HEART RATE: 98 BPM | RESPIRATION RATE: 18 BRPM

## 2019-06-05 LAB
ABNORMAL IP MESSAGE: 1
ADD MAN DIFF?: NO
ANION GAP: 6 (ref 5–13)
BASOPHIL #: 0.1 10^3/UL (ref 0–0.1)
BASOPHILS %: 0.5 % (ref 0–2)
BLOOD UREA NITROGEN: 7 MG/DL (ref 7–20)
CALCIUM: 8.1 MG/DL (ref 8.4–10.2)
CARBON DIOXIDE: 28 MMOL/L (ref 21–31)
CHLORIDE: 105 MMOL/L (ref 97–110)
CREATININE: 0.63 MG/DL (ref 0.61–1.24)
EOSINOPHILS #: 0.2 10^3/UL (ref 0–0.5)
EOSINOPHILS %: 1.5 % (ref 0–7)
GLUCOSE: 99 MG/DL (ref 70–220)
HEMATOCRIT: 33 % (ref 42–52)
HEMOGLOBIN: 11.2 G/DL (ref 14–18)
IMMATURE GRANS #M: 0.11 10^3/UL (ref 0–0.03)
IMMATURE GRANS % (M): 0.7 % (ref 0–0.43)
LYMPHOCYTES #: 1.6 10^3/UL (ref 0.8–2.9)
LYMPHOCYTES %: 11 % (ref 15–51)
MAGNESIUM: 2 MG/DL (ref 1.7–2.5)
MEAN CORPUSCULAR HEMOGLOBIN: 30.8 PG (ref 29–33)
MEAN CORPUSCULAR HGB CONC: 33.9 G/DL (ref 32–37)
MEAN CORPUSCULAR VOLUME: 90.7 FL (ref 82–101)
MEAN PLATELET VOLUME: 8.4 FL (ref 7.4–10.4)
MONOCYTE #: 1.6 10^3/UL (ref 0.3–0.9)
MONOCYTES %: 10.5 % (ref 0–11)
NEUTROPHIL #: 11.3 10^3/UL (ref 1.6–7.5)
NEUTROPHILS %: 75.8 % (ref 39–77)
NUCLEATED RED BLOOD CELLS #: 0 10^3/UL (ref 0–0)
NUCLEATED RED BLOOD CELLS%: 0 /100WBC (ref 0–0)
PLATELET COUNT: 415 10^3/UL (ref 140–415)
POSITIVE DIFF: (no result)
POTASSIUM: 3.3 MMOL/L (ref 3.5–5.1)
RED BLOOD COUNT: 3.64 10^6/UL (ref 4.7–6.1)
RED CELL DISTRIBUTION WIDTH: 12.2 % (ref 11.5–14.5)
SODIUM: 139 MMOL/L (ref 135–144)
VANCOMYCIN,TROUGH: 9.4 UG/ML (ref 10–20)
WHITE BLOOD COUNT: 14.9 10^3/UL (ref 4.8–10.8)

## 2019-06-05 RX ADMIN — LORAZEPAM 1 MG: 2 INJECTION, SOLUTION INTRAMUSCULAR; INTRAVENOUS at 06:36

## 2019-06-05 RX ADMIN — MORPHINE SULFATE 1 MG: 2 INJECTION, SOLUTION INTRAMUSCULAR; INTRAVENOUS at 04:57

## 2019-06-05 RX ADMIN — ASCORBIC ACID, VITAMIN A PALMITATE, CHOLECALCIFEROL, THIAMINE HYDROCHLORIDE, RIBOFLAVIN-5 PHOSPHATE SODIUM, PYRIDOXINE HYDROCHLORIDE, NIACINAMIDE, DEXPANTHENOL, ALPHA-TOCOPHEROL ACETATE, VITAMIN K1, FOLIC ACID, BIOTIN, CYANOCOBALAMIN SCH MLS/HR: 200; 3300; 200; 6; 3.6; 6; 40; 15; 10; 150; 600; 60; 5 INJECTION, SOLUTION INTRAVENOUS at 23:01

## 2019-06-05 RX ADMIN — LORAZEPAM PRN MG: 2 INJECTION, SOLUTION INTRAMUSCULAR; INTRAVENOUS at 06:36

## 2019-06-05 RX ADMIN — MORPHINE SULFATE PRN MG: 2 INJECTION, SOLUTION INTRAMUSCULAR; INTRAVENOUS at 02:52

## 2019-06-05 RX ADMIN — PIPERACILLIN SODIUM AND TAZOBACTAM SODIUM 1 MLS/HR: 3; .375 INJECTION, POWDER, LYOPHILIZED, FOR SOLUTION INTRAVENOUS at 13:42

## 2019-06-05 RX ADMIN — VANCOMYCIN HYDROCHLORIDE SCH MLS/HR: 1 INJECTION, POWDER, LYOPHILIZED, FOR SOLUTION INTRAVENOUS at 14:48

## 2019-06-05 RX ADMIN — MORPHINE SULFATE PRN MG: 2 INJECTION, SOLUTION INTRAMUSCULAR; INTRAVENOUS at 17:00

## 2019-06-05 RX ADMIN — ENOXAPARIN SODIUM 1 MG: 100 INJECTION SUBCUTANEOUS at 06:13

## 2019-06-05 RX ADMIN — MORPHINE SULFATE 1 MG: 2 INJECTION, SOLUTION INTRAMUSCULAR; INTRAVENOUS at 13:42

## 2019-06-05 RX ADMIN — PIPERACILLIN SODIUM AND TAZOBACTAM SODIUM 1 MLS/HR: 3; .375 INJECTION, POWDER, LYOPHILIZED, FOR SOLUTION INTRAVENOUS at 18:14

## 2019-06-05 RX ADMIN — MORPHINE SULFATE 1 MG: 2 INJECTION, SOLUTION INTRAMUSCULAR; INTRAVENOUS at 10:16

## 2019-06-05 RX ADMIN — POTASSIUM CHLORIDE 1 MEQ: 1500 TABLET, EXTENDED RELEASE ORAL at 13:42

## 2019-06-05 RX ADMIN — MORPHINE SULFATE 1 MG: 2 INJECTION, SOLUTION INTRAMUSCULAR; INTRAVENOUS at 17:00

## 2019-06-05 RX ADMIN — LORAZEPAM PRN MG: 2 INJECTION, SOLUTION INTRAMUSCULAR; INTRAVENOUS at 14:57

## 2019-06-05 RX ADMIN — PIPERACILLIN SODIUM AND TAZOBACTAM SODIUM 1 MLS/HR: 3; .375 INJECTION, POWDER, LYOPHILIZED, FOR SOLUTION INTRAVENOUS at 05:25

## 2019-06-05 RX ADMIN — MORPHINE SULFATE PRN MG: 2 INJECTION, SOLUTION INTRAMUSCULAR; INTRAVENOUS at 13:42

## 2019-06-05 RX ADMIN — ASCORBIC ACID, VITAMIN A PALMITATE, CHOLECALCIFEROL, THIAMINE HYDROCHLORIDE, RIBOFLAVIN-5 PHOSPHATE SODIUM, PYRIDOXINE HYDROCHLORIDE, NIACINAMIDE, DEXPANTHENOL, ALPHA-TOCOPHEROL ACETATE, VITAMIN K1, FOLIC ACID, BIOTIN, CYANOCOBALAMIN 1 MLS/HR: 200; 3300; 200; 6; 3.6; 6; 40; 15; 10; 150; 600; 60; 5 INJECTION, SOLUTION INTRAVENOUS at 14:48

## 2019-06-05 RX ADMIN — ASCORBIC ACID, VITAMIN A PALMITATE, CHOLECALCIFEROL, THIAMINE HYDROCHLORIDE, RIBOFLAVIN-5 PHOSPHATE SODIUM, PYRIDOXINE HYDROCHLORIDE, NIACINAMIDE, DEXPANTHENOL, ALPHA-TOCOPHEROL ACETATE, VITAMIN K1, FOLIC ACID, BIOTIN, CYANOCOBALAMIN 1 MLS/HR: 200; 3300; 200; 6; 3.6; 6; 40; 15; 10; 150; 600; 60; 5 INJECTION, SOLUTION INTRAVENOUS at 23:01

## 2019-06-05 RX ADMIN — ASCORBIC ACID, VITAMIN A PALMITATE, CHOLECALCIFEROL, THIAMINE HYDROCHLORIDE, RIBOFLAVIN-5 PHOSPHATE SODIUM, PYRIDOXINE HYDROCHLORIDE, NIACINAMIDE, DEXPANTHENOL, ALPHA-TOCOPHEROL ACETATE, VITAMIN K1, FOLIC ACID, BIOTIN, CYANOCOBALAMIN SCH MLS/HR: 200; 3300; 200; 6; 3.6; 6; 40; 15; 10; 150; 600; 60; 5 INJECTION, SOLUTION INTRAVENOUS at 14:48

## 2019-06-05 RX ADMIN — LORAZEPAM PRN MG: 2 INJECTION, SOLUTION INTRAMUSCULAR; INTRAVENOUS at 00:36

## 2019-06-05 RX ADMIN — VANCOMYCIN HYDROCHLORIDE SCH MLS/HR: 1 INJECTION, POWDER, LYOPHILIZED, FOR SOLUTION INTRAVENOUS at 22:06

## 2019-06-05 RX ADMIN — MORPHINE SULFATE 1 MG: 2 INJECTION, SOLUTION INTRAMUSCULAR; INTRAVENOUS at 02:52

## 2019-06-05 RX ADMIN — PIPERACILLIN SODIUM AND TAZOBACTAM SODIUM SCH MLS/HR: 3; .375 INJECTION, POWDER, LYOPHILIZED, FOR SOLUTION INTRAVENOUS at 18:14

## 2019-06-05 RX ADMIN — LORAZEPAM 1 MG: 2 INJECTION, SOLUTION INTRAMUSCULAR; INTRAVENOUS at 14:57

## 2019-06-05 RX ADMIN — VANCOMYCIN HYDROCHLORIDE 1 MLS/HR: 1 INJECTION, POWDER, LYOPHILIZED, FOR SOLUTION INTRAVENOUS at 22:06

## 2019-06-05 RX ADMIN — PIPERACILLIN SODIUM AND TAZOBACTAM SODIUM SCH MLS/HR: 3; .375 INJECTION, POWDER, LYOPHILIZED, FOR SOLUTION INTRAVENOUS at 13:42

## 2019-06-05 RX ADMIN — MORPHINE SULFATE PRN MG: 2 INJECTION, SOLUTION INTRAMUSCULAR; INTRAVENOUS at 04:57

## 2019-06-05 RX ADMIN — ENOXAPARIN SODIUM SCH MG: 100 INJECTION SUBCUTANEOUS at 06:13

## 2019-06-05 RX ADMIN — PIPERACILLIN SODIUM AND TAZOBACTAM SODIUM SCH MLS/HR: 3; .375 INJECTION, POWDER, LYOPHILIZED, FOR SOLUTION INTRAVENOUS at 05:25

## 2019-06-05 RX ADMIN — PIPERACILLIN SODIUM AND TAZOBACTAM SODIUM 1 MLS/HR: 3; .375 INJECTION, POWDER, LYOPHILIZED, FOR SOLUTION INTRAVENOUS at 00:36

## 2019-06-05 RX ADMIN — VANCOMYCIN HYDROCHLORIDE SCH MLS/HR: 1 INJECTION, POWDER, LYOPHILIZED, FOR SOLUTION INTRAVENOUS at 06:09

## 2019-06-05 RX ADMIN — LORAZEPAM 1 MG: 2 INJECTION, SOLUTION INTRAMUSCULAR; INTRAVENOUS at 22:59

## 2019-06-05 RX ADMIN — VANCOMYCIN HYDROCHLORIDE 1 MLS/HR: 1 INJECTION, POWDER, LYOPHILIZED, FOR SOLUTION INTRAVENOUS at 14:48

## 2019-06-05 RX ADMIN — MORPHINE SULFATE PRN MG: 2 INJECTION, SOLUTION INTRAMUSCULAR; INTRAVENOUS at 10:16

## 2019-06-05 RX ADMIN — LORAZEPAM 1 MG: 2 INJECTION, SOLUTION INTRAMUSCULAR; INTRAVENOUS at 00:36

## 2019-06-05 RX ADMIN — PIPERACILLIN SODIUM AND TAZOBACTAM SODIUM SCH MLS/HR: 3; .375 INJECTION, POWDER, LYOPHILIZED, FOR SOLUTION INTRAVENOUS at 00:36

## 2019-06-05 RX ADMIN — VANCOMYCIN HYDROCHLORIDE 1 MLS/HR: 1 INJECTION, POWDER, LYOPHILIZED, FOR SOLUTION INTRAVENOUS at 06:09

## 2019-06-05 RX ADMIN — LORAZEPAM PRN MG: 2 INJECTION, SOLUTION INTRAMUSCULAR; INTRAVENOUS at 22:59

## 2019-06-05 RX ADMIN — HYDROCODONE BITARTRATE AND ACETAMINOPHEN 1 TAB: 10; 325 TABLET ORAL at 20:24

## 2019-06-05 NOTE — CONS
Assessment/Plan


Assessment/Plan


Hospital Course (Demo Recall)


Patient is sleeping in no distress no fevers overnight.  WBC 14.9 neutrophils 


75.8 BUN 7 creatinine 0.63





Left buttock wound culture preliminary growing staph aureus





Antimicrobials: Vanco Zosyn





PHYSICAL EXAMINATION:


GENERAL:  He is alert, responsive, in no acute distress.


SKIN:  Without generalized rash.


HEENT:  Within normal limits.


NECK:  Supple.


LYMPH NODES:  None palpable.


CHEST:  Decreased breath sounds at the bases.


HEART:  Without murmur or gallop.


ABDOMEN:  Soft, nontender without organosplenomegaly or masses.


EXTREMITIES:  Left hip is bandaged at this point.  Right lower extremity without


cyanosis, clubbing or edema.





Assessment:


1.  Left hip abscess, status post I&D 


2.  Systemic inflammatory response syndrome 


3.  Heroine abuse





Plan: Remains stable, continue antibiotics, await for final cultures, will be 


likely discharged on oral Bactrim or clindamycin





Consultation Date/Type/Reason


Admit Date/Time


Jose 3, 2019 at 02:21


Initial Consult Date


6/3/19


Type of Consult


id


Date/Time of Note


DATE: 6/5/19 


TIME: 16:22





Exam/Review of Systems


Exam


Vitals





Vital Signs


  Date      Temp  Pulse  Resp  B/P (MAP)   Pulse Ox  O2          O2 Flow    FiO2


Time                                                 Delivery    Rate


    6/5/19  98.5    102    19      119/71        98  Room Air


     15:01                           (87)


    6/3/19                                                             6.0


     18:37








Intake and Output





6/4/19 6/4/19 6/5/19





1515:00


23:00


07:00





IntakeIntake Total


1460 ml


2270 ml


1090 ml





OutputOutput Total


2800 ml


900 ml


3000 ml





BalanceBalance


-1340 ml


1370 ml


-1910 ml














Results


Result Diagram:  


6/5/19 0456                                                                     


          6/5/19 0456





Results 24hrs





Laboratory Tests


               Test
                                6/5/19
04:56


               White Blood Count                         14.9  H


               Red Blood Count                           3.64  L


               Hemoglobin                                11.2  L


               Hematocrit                                33.0  L


               Mean Corpuscular Volume                    90.7


               Mean Corpuscular Hemoglobin                30.8


               Mean Corpuscular Hemoglobin
Concent       33.9  



               Red Cell Distribution Width                12.2


               Platelet Count                              415


               Mean Platelet Volume                        8.4


               Immature Granulocytes %                  0.700  H


               Neutrophils %                              75.8


               Lymphocytes %                             11.0  L


               Monocytes %                                10.5


               Eosinophils %                               1.5


               Basophils %                                 0.5


               Nucleated Red Blood Cells %                 0.0


               Immature Granulocytes #                  0.110  H


               Neutrophils #                             11.3  H


               Lymphocytes #                               1.6


               Monocytes #                                1.6  H


               Eosinophils #                               0.2


               Basophils #                                 0.1


               Nucleated Red Blood Cells #                 0.0


               Sodium Level                                139


               Potassium Level                            3.3  L


               Chloride Level                              105


               Carbon Dioxide Level                         28


               Anion Gap                                     6


               Blood Urea Nitrogen                           7


               Creatinine                                 0.63


               Est Glomerular Filtrat Rate
mL/min   > 60  



               Glucose Level                                99


               Calcium Level                              8.1  L


               Magnesium Level                             2.0








Medications


Medication





Current Medications


IV Flush (NS 3 ml) 3 ml PER PROTOCOL IV ;  Start 6/3/19 at 03:00


Piperacillin Sod/ Tazobactam Sod 100 ml @  200 mls/hr Q6 IVPB  Last administered


on 6/5/19at 13:42; Admin Dose 200 MLS/HR;  Start 6/3/19 at 06:00


Vancomycin HCl (Vanco Iv Per Pharmacy) VANCOMYCIN PER PHARMACY PER PROTOCOL XX ;


 Start 6/3/19 at 03:30


Albuterol/ Ipratropium (Duoneb) 3 ml Q4H RESP THERAPY  PRN HHN SHORTNESS OF 


BREATH;  Start 6/3/19 at 04:00


Morphine Sulfate (morphine) 2 mg Q2H  PRN IV PAIN LEVEL 6-10 Last administered 


on 6/5/19at 13:42; Admin Dose 2 MG;  Start 6/3/19 at 18:00


Acetaminophen/ Hydrocodone Bitart (Norco (10/325)) 1 tab Q6H  PRN PO PAIN Last 


administered on 6/4/19at 18:18; Admin Dose 1 TAB;  Start 6/3/19 at 18:00


Acetaminophen (Tylenol Tab) 650 mg Q6H  PRN PO MILD PAIN(1-3)OR ELEVATED TEMP;  


Start 6/3/19 at 18:00


Ondansetron HCl (Zofran Inj) 4 mg Q6H  PRN IV NAUSEA AND/OR VOMITING;  Start 


6/3/19 at 18:00


Potassium Chloride/Dextrose/ Sod Cl 1,000 ml @  75 mls/hr [id number] IV  Last 


administered on 6/5/19at 14:48; Admin Dose 75 MLS/HR;  Start 6/3/19 at 17:41


Enoxaparin Sodium (Lovenox) 40 mg DAILY@07 SC  Last administered on 6/5/19at 


06:13; Admin Dose 40 MG;  Start 6/4/19 at 07:00


Vancomycin HCl 250 ml @  125 mls/hr Q8H IVPB  Last administered on 6/5/19at 


14:48; Admin Dose 125 MLS/HR;  Start 6/4/19 at 22:00


Lorazepam (Ativan) 1 mg Q6H  PRN IV ANXIETY Last administered on 6/5/19at 14:57;


Admin Dose 1 MG;  Start 6/4/19 at 18:30


Miscellaneous Information (*Rx Drug Level Order Reminder*) VANCO TR 6/ 5 AT 2100


2100  ONCE XX ;  Start 6/5/19 at 21:00;  Stop 6/5/19 at 21:01











JOSE CARLOS NP             Jun 5, 2019 16:25

## 2019-06-05 NOTE — PN
Date/Time of Note


Date/Time of Note


DATE: 6/5/19 


TIME: 14:45





Assessment/Plan


VTE Prophylaxis


Risk score (from Nsg)>0 risk:  0


Pharmacological prophylaxis:  NA/contraindicated


Pharm contraindication:  low risk/ambulating





Lines/Catheters


IV Catheter Type (from Nrsg):  Peripheral IV


Urinary Cath still in place:  No





Assessment/Plan


Hospital Course


37 yo heroin addict presents with L hip abscess from intramuscular injection.





#Left hip abscess


-CT is consistent with abscess


-Status post surgical drainage with surgery


- IV vanco, zosyn


-Wound culture currently growing staph


-ID consultation appreciated


-Wound care





#Asthma


- Albuterol prn





#Heroin use


- Patient is motivated to quit, social work consult appreciated, patient will be


placed in a drug rehab program after DC





DVT: SCDs


GI: None





DC planning: Family requesting placement in a drug rehab program but patient 


cannot be excepted with current wounds, possible nursing home placement


Result Diagram:  


6/5/19 0456                                                                     


          6/5/19 0456





Results 24hrs





Laboratory Tests


               Test
                                6/5/19
04:56


               White Blood Count                         14.9  H


               Red Blood Count                           3.64  L


               Hemoglobin                                11.2  L


               Hematocrit                                33.0  L


               Mean Corpuscular Volume                    90.7


               Mean Corpuscular Hemoglobin                30.8


               Mean Corpuscular Hemoglobin
Concent       33.9  



               Red Cell Distribution Width                12.2


               Platelet Count                              415


               Mean Platelet Volume                        8.4


               Immature Granulocytes %                  0.700  H


               Neutrophils %                              75.8


               Lymphocytes %                             11.0  L


               Monocytes %                                10.5


               Eosinophils %                               1.5


               Basophils %                                 0.5


               Nucleated Red Blood Cells %                 0.0


               Immature Granulocytes #                  0.110  H


               Neutrophils #                             11.3  H


               Lymphocytes #                               1.6


               Monocytes #                                1.6  H


               Eosinophils #                               0.2


               Basophils #                                 0.1


               Nucleated Red Blood Cells #                 0.0


               Sodium Level                                139


               Potassium Level                            3.3  L


               Chloride Level                              105


               Carbon Dioxide Level                         28


               Anion Gap                                     6


               Blood Urea Nitrogen                           7


               Creatinine                                 0.63


               Est Glomerular Filtrat Rate
mL/min   > 60  



               Glucose Level                                99


               Calcium Level                              8.1  L


               Magnesium Level                             2.0








Subjective


24 Hr Interval Summary


Constitutional:  no complaints





Exam/Review of Systems


Exam


Vitals





Vital Signs


  Date      Temp  Pulse  Resp  B/P (MAP)   Pulse Ox  O2          O2 Flow    FiO2


Time                                                 Delivery    Rate


    6/5/19  99.0    100    19      120/79        98  Room Air


     08:14                           (93)


    6/3/19                                                             6.0


     18:37








Intake and Output





6/4/19 6/4/19 6/5/19





1515:00


23:00


07:00





IntakeIntake Total


1460 ml


2270 ml


1090 ml





OutputOutput Total


2800 ml


900 ml


3000 ml





BalanceBalance


-1340 ml


1370 ml


-1910 ml











Constitutional:  alert, oriented


Respiratory:  clear to auscultation


Cardiovascular:  regular rate and rhythm


Gastrointestinal:  soft; 


   No distended


Musculoskeletal:  nl extremities to inspection





Results


Results 24hrs





Laboratory Tests


               Test
                                6/5/19
04:56


               White Blood Count                         14.9  H


               Red Blood Count                           3.64  L


               Hemoglobin                                11.2  L


               Hematocrit                                33.0  L


               Mean Corpuscular Volume                    90.7


               Mean Corpuscular Hemoglobin                30.8


               Mean Corpuscular Hemoglobin
Concent       33.9  



               Red Cell Distribution Width                12.2


               Platelet Count                              415


               Mean Platelet Volume                        8.4


               Immature Granulocytes %                  0.700  H


               Neutrophils %                              75.8


               Lymphocytes %                             11.0  L


               Monocytes %                                10.5


               Eosinophils %                               1.5


               Basophils %                                 0.5


               Nucleated Red Blood Cells %                 0.0


               Immature Granulocytes #                  0.110  H


               Neutrophils #                             11.3  H


               Lymphocytes #                               1.6


               Monocytes #                                1.6  H


               Eosinophils #                               0.2


               Basophils #                                 0.1


               Nucleated Red Blood Cells #                 0.0


               Sodium Level                                139


               Potassium Level                            3.3  L


               Chloride Level                              105


               Carbon Dioxide Level                         28


               Anion Gap                                     6


               Blood Urea Nitrogen                           7


               Creatinine                                 0.63


               Est Glomerular Filtrat Rate
mL/min   > 60  



               Glucose Level                                99


               Calcium Level                              8.1  L


               Magnesium Level                             2.0








Medications


Medication





Current Medications


IV Flush (NS 3 ml) 3 ml PER PROTOCOL IV ;  Start 6/3/19 at 03:00


Piperacillin Sod/ Tazobactam Sod 100 ml @  200 mls/hr Q6 IVPB  Last administered


on 6/5/19at 13:42; Admin Dose 200 MLS/HR;  Start 6/3/19 at 06:00


Vancomycin HCl (Vanco Iv Per Pharmacy) VANCOMYCIN PER PHARMACY PER PROTOCOL XX ;


 Start 6/3/19 at 03:30


Albuterol/ Ipratropium (Duoneb) 3 ml Q4H RESP THERAPY  PRN HHN SHORTNESS OF 


BREATH;  Start 6/3/19 at 04:00


Morphine Sulfate (morphine) 2 mg Q2H  PRN IV PAIN LEVEL 6-10 Last administered 


on 6/5/19at 13:42; Admin Dose 2 MG;  Start 6/3/19 at 18:00


Acetaminophen/ Hydrocodone Bitart (Norco (10/325)) 1 tab Q6H  PRN PO PAIN Last 


administered on 6/4/19at 18:18; Admin Dose 1 TAB;  Start 6/3/19 at 18:00


Acetaminophen (Tylenol Tab) 650 mg Q6H  PRN PO MILD PAIN(1-3)OR ELEVATED TEMP;  


Start 6/3/19 at 18:00


Ondansetron HCl (Zofran Inj) 4 mg Q6H  PRN IV NAUSEA AND/OR VOMITING;  Start 


6/3/19 at 18:00


Potassium Chloride/Dextrose/ Sod Cl 1,000 ml @  75 mls/hr [id number] IV  Last 


administered on 6/4/19at 18:20; Admin Dose 75 MLS/HR;  Start 6/3/19 at 17:41


Enoxaparin Sodium (Lovenox) 40 mg DAILY@07 SC  Last administered on 6/5/19at 


06:13; Admin Dose 40 MG;  Start 6/4/19 at 07:00


Vancomycin HCl 250 ml @  125 mls/hr Q8H IVPB  Last administered on 6/5/19at 


06:09; Admin Dose 125 MLS/HR;  Start 6/4/19 at 22:00


Lorazepam (Ativan) 1 mg Q6H  PRN IV ANXIETY Last administered on 6/5/19at 06:36;


Admin Dose 1 MG;  Start 6/4/19 at 18:30











KIMMY STOVALL                   Jun 5, 2019 14:46

## 2019-06-06 VITALS — HEART RATE: 101 BPM | RESPIRATION RATE: 18 BRPM | SYSTOLIC BLOOD PRESSURE: 104 MMHG | DIASTOLIC BLOOD PRESSURE: 63 MMHG

## 2019-06-06 VITALS — RESPIRATION RATE: 18 BRPM | HEART RATE: 102 BPM | DIASTOLIC BLOOD PRESSURE: 62 MMHG | SYSTOLIC BLOOD PRESSURE: 111 MMHG

## 2019-06-06 VITALS — DIASTOLIC BLOOD PRESSURE: 61 MMHG | RESPIRATION RATE: 18 BRPM | HEART RATE: 103 BPM | SYSTOLIC BLOOD PRESSURE: 99 MMHG

## 2019-06-06 VITALS — RESPIRATION RATE: 18 BRPM | SYSTOLIC BLOOD PRESSURE: 110 MMHG | HEART RATE: 95 BPM | DIASTOLIC BLOOD PRESSURE: 63 MMHG

## 2019-06-06 LAB
ABNORMAL IP MESSAGE: 1
ADD MAN DIFF?: NO
ANION GAP: 7 (ref 5–13)
BASOPHIL #: 0.1 10^3/UL (ref 0–0.1)
BASOPHILS %: 0.3 % (ref 0–2)
BLOOD UREA NITROGEN: 3 MG/DL (ref 7–20)
CALCIUM: 8.2 MG/DL (ref 8.4–10.2)
CARBON DIOXIDE: 25 MMOL/L (ref 21–31)
CHLORIDE: 106 MMOL/L (ref 97–110)
CREATININE: 0.65 MG/DL (ref 0.61–1.24)
EOSINOPHILS #: 0.2 10^3/UL (ref 0–0.5)
EOSINOPHILS %: 1.4 % (ref 0–7)
GLUCOSE: 109 MG/DL (ref 70–220)
HEMATOCRIT: 32.5 % (ref 42–52)
HEMOGLOBIN: 10.8 G/DL (ref 14–18)
IMMATURE GRANS #M: 0.19 10^3/UL (ref 0–0.03)
IMMATURE GRANS % (M): 1.1 % (ref 0–0.43)
LYMPHOCYTES #: 1.5 10^3/UL (ref 0.8–2.9)
LYMPHOCYTES %: 8.9 % (ref 15–51)
MEAN CORPUSCULAR HEMOGLOBIN: 29.6 PG (ref 29–33)
MEAN CORPUSCULAR HGB CONC: 33.2 G/DL (ref 32–37)
MEAN CORPUSCULAR VOLUME: 89 FL (ref 82–101)
MEAN PLATELET VOLUME: 8.2 FL (ref 7.4–10.4)
MONOCYTE #: 1.6 10^3/UL (ref 0.3–0.9)
MONOCYTES %: 9.5 % (ref 0–11)
NEUTROPHIL #: 13.7 10^3/UL (ref 1.6–7.5)
NEUTROPHILS %: 78.8 % (ref 39–77)
NUCLEATED RED BLOOD CELLS #: 0 10^3/UL (ref 0–0)
NUCLEATED RED BLOOD CELLS%: 0 /100WBC (ref 0–0)
PLATELET COUNT: 428 10^3/UL (ref 140–415)
POSITIVE DIFF: (no result)
POTASSIUM: 3.7 MMOL/L (ref 3.5–5.1)
RED BLOOD COUNT: 3.65 10^6/UL (ref 4.7–6.1)
RED CELL DISTRIBUTION WIDTH: 12.4 % (ref 11.5–14.5)
SODIUM: 138 MMOL/L (ref 135–144)
WHITE BLOOD COUNT: 17.3 10^3/UL (ref 4.8–10.8)

## 2019-06-06 RX ADMIN — PIPERACILLIN SODIUM AND TAZOBACTAM SODIUM 1 MLS/HR: 3; .375 INJECTION, POWDER, LYOPHILIZED, FOR SOLUTION INTRAVENOUS at 05:35

## 2019-06-06 RX ADMIN — MORPHINE SULFATE PRN MG: 2 INJECTION, SOLUTION INTRAMUSCULAR; INTRAVENOUS at 04:33

## 2019-06-06 RX ADMIN — CEFAZOLIN SCH MLS/HR: 1 INJECTION, POWDER, FOR SOLUTION INTRAMUSCULAR; INTRAVENOUS at 18:15

## 2019-06-06 RX ADMIN — CEFAZOLIN 1 MLS/HR: 1 INJECTION, POWDER, FOR SOLUTION INTRAMUSCULAR; INTRAVENOUS at 18:15

## 2019-06-06 RX ADMIN — ENOXAPARIN SODIUM 1 MG: 100 INJECTION SUBCUTANEOUS at 06:27

## 2019-06-06 RX ADMIN — VANCOMYCIN HYDROCHLORIDE 1 MLS/HR: 500 INJECTION, POWDER, LYOPHILIZED, FOR SOLUTION INTRAVENOUS at 06:21

## 2019-06-06 RX ADMIN — LEVOFLOXACIN 1 MG: 750 TABLET, FILM COATED ORAL at 14:56

## 2019-06-06 RX ADMIN — MORPHINE SULFATE 1 MG: 2 INJECTION, SOLUTION INTRAMUSCULAR; INTRAVENOUS at 12:40

## 2019-06-06 RX ADMIN — PIPERACILLIN SODIUM AND TAZOBACTAM SODIUM SCH MLS/HR: 3; .375 INJECTION, POWDER, LYOPHILIZED, FOR SOLUTION INTRAVENOUS at 00:33

## 2019-06-06 RX ADMIN — PIPERACILLIN SODIUM AND TAZOBACTAM SODIUM SCH MLS/HR: 3; .375 INJECTION, POWDER, LYOPHILIZED, FOR SOLUTION INTRAVENOUS at 05:35

## 2019-06-06 RX ADMIN — MORPHINE SULFATE 1 MG: 2 INJECTION, SOLUTION INTRAMUSCULAR; INTRAVENOUS at 04:33

## 2019-06-06 RX ADMIN — MORPHINE SULFATE 1 MG: 2 INJECTION, SOLUTION INTRAMUSCULAR; INTRAVENOUS at 21:46

## 2019-06-06 RX ADMIN — PIPERACILLIN SODIUM AND TAZOBACTAM SODIUM SCH MLS/HR: 3; .375 INJECTION, POWDER, LYOPHILIZED, FOR SOLUTION INTRAVENOUS at 12:37

## 2019-06-06 RX ADMIN — LORAZEPAM PRN MG: 2 INJECTION, SOLUTION INTRAMUSCULAR; INTRAVENOUS at 05:56

## 2019-06-06 RX ADMIN — LORAZEPAM 1 MG: 2 INJECTION, SOLUTION INTRAMUSCULAR; INTRAVENOUS at 23:53

## 2019-06-06 RX ADMIN — MORPHINE SULFATE PRN MG: 2 INJECTION, SOLUTION INTRAMUSCULAR; INTRAVENOUS at 21:46

## 2019-06-06 RX ADMIN — HYDROCODONE BITARTRATE AND ACETAMINOPHEN 1 TAB: 10; 325 TABLET ORAL at 07:42

## 2019-06-06 RX ADMIN — CEFAZOLIN SCH MLS/HR: 1 INJECTION, POWDER, FOR SOLUTION INTRAMUSCULAR; INTRAVENOUS at 23:53

## 2019-06-06 RX ADMIN — MORPHINE SULFATE PRN MG: 2 INJECTION, SOLUTION INTRAMUSCULAR; INTRAVENOUS at 12:40

## 2019-06-06 RX ADMIN — ENOXAPARIN SODIUM SCH MG: 100 INJECTION SUBCUTANEOUS at 06:27

## 2019-06-06 RX ADMIN — MORPHINE SULFATE 1 MG: 2 INJECTION, SOLUTION INTRAMUSCULAR; INTRAVENOUS at 17:23

## 2019-06-06 RX ADMIN — HYDROCODONE BITARTRATE AND ACETAMINOPHEN 1 TAB: 10; 325 TABLET ORAL at 18:21

## 2019-06-06 RX ADMIN — PIPERACILLIN SODIUM AND TAZOBACTAM SODIUM 1 MLS/HR: 3; .375 INJECTION, POWDER, LYOPHILIZED, FOR SOLUTION INTRAVENOUS at 00:33

## 2019-06-06 RX ADMIN — MORPHINE SULFATE PRN MG: 2 INJECTION, SOLUTION INTRAMUSCULAR; INTRAVENOUS at 17:23

## 2019-06-06 RX ADMIN — PIPERACILLIN SODIUM AND TAZOBACTAM SODIUM 1 MLS/HR: 3; .375 INJECTION, POWDER, LYOPHILIZED, FOR SOLUTION INTRAVENOUS at 12:37

## 2019-06-06 RX ADMIN — LORAZEPAM PRN MG: 2 INJECTION, SOLUTION INTRAMUSCULAR; INTRAVENOUS at 23:53

## 2019-06-06 RX ADMIN — CEFAZOLIN 1 MLS/HR: 1 INJECTION, POWDER, FOR SOLUTION INTRAMUSCULAR; INTRAVENOUS at 23:53

## 2019-06-06 RX ADMIN — LORAZEPAM 1 MG: 2 INJECTION, SOLUTION INTRAMUSCULAR; INTRAVENOUS at 05:56

## 2019-06-06 RX ADMIN — LORAZEPAM PRN MG: 2 INJECTION, SOLUTION INTRAMUSCULAR; INTRAVENOUS at 14:55

## 2019-06-06 RX ADMIN — LORAZEPAM 1 MG: 2 INJECTION, SOLUTION INTRAMUSCULAR; INTRAVENOUS at 14:55

## 2019-06-06 NOTE — CONS
Assessment/Plan


Assessment/Plan


Hospital Course (Demo Recall)


Patient is sleeping no fevers overnight WBC 17.3 platelets 428 neutrophils 78.8 


BUN 3 creatinine 0.65





Wound culture grew oxacillin sensitive staph aureus





Antimicrobials: Vanco Zosyn





PHYSICAL EXAMINATION:


GENERAL:  He is alert, responsive, in no acute distress.


SKIN:  Without generalized rash.


HEENT:  Within normal limits.


NECK:  Supple.


LYMPH NODES:  None palpable.


CHEST:  Decreased breath sounds at the bases.


HEART:  Without murmur or gallop.


ABDOMEN:  Soft, nontender without organosplenomegaly or masses.





Assessment:


1.  Systemic inflammatory response syndrome


2.  Community-acquired pneumonia


3.  Left hip abscess, status post I&D 


4.  Heroine abuse





Plan: Clinically unchanged, stable, change antibiotics to oral Levaquin and 


Ancef, follow chest x-ray





Consultation Date/Type/Reason


Admit Date/Time


Jose 3, 2019 at 02:21


Initial Consult Date


6/3/19


Type of Consult


id


Date/Time of Note


DATE: 6/6/19 


TIME: 13:46





Exam/Review of Systems


Exam


Vitals





Vital Signs


  Date      Temp  Pulse  Resp  B/P (MAP)   Pulse Ox  O2          O2 Flow    FiO2


Time                                                 Delivery    Rate


    6/6/19  99.3    103    18  99/61 (74)        98  Room Air


     13:43


    6/3/19                                                             6.0


     18:37








Intake and Output





6/5/19 6/5/19 6/6/19





1515:00


23:00


07:00





IntakeIntake Total


1440 ml


2090 ml


850 ml





OutputOutput Total


2050 ml


2550 ml


1700 ml





BalanceBalance


-610 ml


-460 ml


-850 ml














Results


Result Diagram:  


6/6/19 0501                                                                     


          6/6/19 0501





Results 24hrs





Laboratory Tests


        Test
                                6/5/19
21:06  6/6/19
05:01


        Vancomycin Level Trough                    9.4  L


        White Blood Count                                       17.3  H


        Red Blood Count                                         3.65  L


        Hemoglobin                                              10.8  L


        Hematocrit                                              32.5  L


        Mean Corpuscular Volume                                  89.0


        Mean Corpuscular Hemoglobin                              29.6


        Mean Corpuscular Hemoglobin
Concent  
                  33.2  



        Red Cell Distribution Width                              12.4


        Platelet Count                                           428  H


        Mean Platelet Volume                                      8.2


        Immature Granulocytes %                                1.100  H


        Neutrophils %                                           78.8  H


        Lymphocytes %                                            8.9  L


        Monocytes %                                               9.5


        Eosinophils %                                             1.4


        Basophils %                                               0.3


        Nucleated Red Blood Cells %                               0.0


        Immature Granulocytes #                                0.190  H


        Neutrophils #                                           13.7  H


        Lymphocytes #                                             1.5


        Monocytes #                                              1.6  H


        Eosinophils #                                             0.2


        Basophils #                                               0.1


        Nucleated Red Blood Cells #                               0.0


        Sodium Level                                              138


        Potassium Level                                           3.7


        Chloride Level                                            106


        Carbon Dioxide Level                                       25


        Anion Gap                                                   7


        Blood Urea Nitrogen                                        3  L


        Creatinine                                               0.65


        Est Glomerular Filtrat Rate
mL/min   
             > 60  



        Glucose Level                                             109


        Calcium Level                                            8.2  L








Medications


Medication





Current Medications


IV Flush (NS 3 ml) 3 ml PER PROTOCOL IV ;  Start 6/3/19 at 03:00


Piperacillin Sod/ Tazobactam Sod 100 ml @  200 mls/hr Q6 IVPB  Last administered


on 6/6/19at 12:37; Admin Dose 200 MLS/HR;  Start 6/3/19 at 06:00


Vancomycin HCl (Vanco Iv Per Pharmacy) VANCOMYCIN PER PHARMACY PER PROTOCOL XX ;


 Start 6/3/19 at 03:30


Albuterol/ Ipratropium (Duoneb) 3 ml Q4H RESP THERAPY  PRN HHN SHORTNESS OF 


BREATH;  Start 6/3/19 at 04:00


Morphine Sulfate (morphine) 2 mg Q2H  PRN IV PAIN LEVEL 6-10 Last administered 


on 6/6/19at 12:40; Admin Dose 2 MG;  Start 6/3/19 at 18:00


Acetaminophen/ Hydrocodone Bitart (Norco (10/325)) 1 tab Q6H  PRN PO PAIN Last 


administered on 6/6/19at 07:42; Admin Dose 1 TAB;  Start 6/3/19 at 18:00


Acetaminophen (Tylenol Tab) 650 mg Q6H  PRN PO MILD PAIN(1-3)OR ELEVATED TEMP;  


Start 6/3/19 at 18:00


Ondansetron HCl (Zofran Inj) 4 mg Q6H  PRN IV NAUSEA AND/OR VOMITING;  Start 


6/3/19 at 18:00


Potassium Chloride/Dextrose/ Sod Cl 1,000 ml @  75 mls/hr [id number] IV  Last 


administered on 6/5/19at 14:48; Admin Dose 75 MLS/HR;  Start 6/3/19 at 17:41


Enoxaparin Sodium (Lovenox) 40 mg DAILY@07 SC  Last administered on 6/6/19at 06


:27; Admin Dose 40 MG;  Start 6/4/19 at 07:00


Lorazepam (Ativan) 1 mg Q6H  PRN IV ANXIETY Last administered on 6/6/19at 05:56;


Admin Dose 1 MG;  Start 6/4/19 at 18:30


Vancomycin HCl 1.25 gm/Sodium Chloride 250 ml @  83.333 mls/ hr Q8H IVPB  Last 


administered on 6/6/19at 06:21; Admin Dose 83.333 MLS/HR;  Start 6/6/19 at 06:00











JOSE CARLOS NP             Jun 6, 2019 13:47

## 2019-06-06 NOTE — PN
Date/Time of Note


Date/Time of Note


DATE: 6/6/19 


TIME: 14:49





Assessment/Plan


VTE Prophylaxis


Risk score (from Ns)>0 risk:  3


SCD applied (from Ns):  Yes


Pharmacological prophylaxis:  NA/contraindicated


Pharm contraindication:  low risk/ambulating





Lines/Catheters


IV Catheter Type (from [facility]):  Peripheral IV


Urinary Cath still in place:  No





Assessment/Plan


Hospital Course


39 yo heroin addict presents with L hip abscess from intramuscular injection.





#Sepsis secondary to left hip abscess


-CT is consistent with abscess


-Status post surgical drainage with surgery


- IV vanco, zosyn


-Wound culture currently growing staph


-ID consultation appreciated


-Wound care





#Asthma


- Albuterol prn





#Heroin use


- Patient is motivated to quit, social work consult appreciated, patient will be


placed in a drug rehab program after DC





DVT: SCDs


GI: None





DC planning: Family requesting placement in a drug rehab program but patient 


cannot be accepted with current wounds, possible nursing home placement


Result Diagram:  


6/6/19 0501                                                                     


          6/6/19 0501





Results 24hrs





Laboratory Tests


        Test
                                6/5/19
21:06  6/6/19
05:01


        Vancomycin Level Trough                    9.4  L


        White Blood Count                                       17.3  H


        Red Blood Count                                         3.65  L


        Hemoglobin                                              10.8  L


        Hematocrit                                              32.5  L


        Mean Corpuscular Volume                                  89.0


        Mean Corpuscular Hemoglobin                              29.6


        Mean Corpuscular Hemoglobin
Concent  
                  33.2  



        Red Cell Distribution Width                              12.4


        Platelet Count                                           428  H


        Mean Platelet Volume                                      8.2


        Immature Granulocytes %                                1.100  H


        Neutrophils %                                           78.8  H


        Lymphocytes %                                            8.9  L


        Monocytes %                                               9.5


        Eosinophils %                                             1.4


        Basophils %                                               0.3


        Nucleated Red Blood Cells %                               0.0


        Immature Granulocytes #                                0.190  H


        Neutrophils #                                           13.7  H


        Lymphocytes #                                             1.5


        Monocytes #                                              1.6  H


        Eosinophils #                                             0.2


        Basophils #                                               0.1


        Nucleated Red Blood Cells #                               0.0


        Sodium Level                                              138


        Potassium Level                                           3.7


        Chloride Level                                            106


        Carbon Dioxide Level                                       25


        Anion Gap                                                   7


        Blood Urea Nitrogen                                        3  L


        Creatinine                                               0.65


        Est Glomerular Filtrat Rate
mL/min   
             > 60  



        Glucose Level                                             109


        Calcium Level                                            8.2  L








Subjective


24 Hr Interval Summary


Constitutional:  no complaints





Exam/Review of Systems


Exam


Vitals





Vital Signs


  Date      Temp  Pulse  Resp  B/P (MAP)   Pulse Ox  O2          O2 Flow    FiO2


Time                                                 Delivery    Rate


    6/6/19  99.3    103    18  99/61 (74)        98  Room Air


     13:43


    6/3/19                                                             6.0


     18:37








Intake and Output





6/5/19 6/5/19 6/6/19





1515:00


23:00


07:00





IntakeIntake Total


1440 ml


2090 ml


850 ml





OutputOutput Total


2050 ml


2550 ml


1700 ml





BalanceBalance


-610 ml


-460 ml


-850 ml











Constitutional:  alert


Respiratory:  clear to auscultation


Cardiovascular:  regular rate and rhythm


Gastrointestinal:  soft; 


   No distended


Musculoskeletal:  nl extremities to inspection





Results


Results 24hrs





Laboratory Tests


        Test
                                6/5/19
21:06  6/6/19
05:01


        Vancomycin Level Trough                    9.4  L


        White Blood Count                                       17.3  H


        Red Blood Count                                         3.65  L


        Hemoglobin                                              10.8  L


        Hematocrit                                              32.5  L


        Mean Corpuscular Volume                                  89.0


        Mean Corpuscular Hemoglobin                              29.6


        Mean Corpuscular Hemoglobin
Concent  
                  33.2  



        Red Cell Distribution Width                              12.4


        Platelet Count                                           428  H


        Mean Platelet Volume                                      8.2


        Immature Granulocytes %                                1.100  H


        Neutrophils %                                           78.8  H


        Lymphocytes %                                            8.9  L


        Monocytes %                                               9.5


        Eosinophils %                                             1.4


        Basophils %                                               0.3


        Nucleated Red Blood Cells %                               0.0


        Immature Granulocytes #                                0.190  H


        Neutrophils #                                           13.7  H


        Lymphocytes #                                             1.5


        Monocytes #                                              1.6  H


        Eosinophils #                                             0.2


        Basophils #                                               0.1


        Nucleated Red Blood Cells #                               0.0


        Sodium Level                                              138


        Potassium Level                                           3.7


        Chloride Level                                            106


        Carbon Dioxide Level                                       25


        Anion Gap                                                   7


        Blood Urea Nitrogen                                        3  L


        Creatinine                                               0.65


        Est Glomerular Filtrat Rate
mL/min   
             > 60  



        Glucose Level                                             109


        Calcium Level                                            8.2  L








Medications


Medication





Current Medications


IV Flush (NS 3 ml) 3 ml PER PROTOCOL IV ;  Start 6/3/19 at 03:00


Albuterol/ Ipratropium (Duoneb) 3 ml Q4H RESP THERAPY  PRN HHN SHORTNESS OF 


BREATH;  Start 6/3/19 at 04:00


Morphine Sulfate (morphine) 2 mg Q2H  PRN IV PAIN LEVEL 6-10 Last administered 


on 6/6/19at 12:40; Admin Dose 2 MG;  Start 6/3/19 at 18:00


Acetaminophen/ Hydrocodone Bitart (Norco (10/325)) 1 tab Q6H  PRN PO PAIN Last 


administered on 6/6/19at 07:42; Admin Dose 1 TAB;  Start 6/3/19 at 18:00


Acetaminophen (Tylenol Tab) 650 mg Q6H  PRN PO MILD PAIN(1-3)OR ELEVATED TEMP;  


Start 6/3/19 at 18:00


Ondansetron HCl (Zofran Inj) 4 mg Q6H  PRN IV NAUSEA AND/OR VOMITING;  Start 


6/3/19 at 18:00


Potassium Chloride/Dextrose/ Sod Cl 1,000 ml @  75 mls/hr [id number] IV  Last 


administered on 6/5/19at 14:48; Admin Dose 75 MLS/HR;  Start 6/3/19 at 17:41


Enoxaparin Sodium (Lovenox) 40 mg DAILY@07 SC  Last administered on 6/6/19at 


06:27; Admin Dose 40 MG;  Start 6/4/19 at 07:00


Lorazepam (Ativan) 1 mg Q6H  PRN IV ANXIETY Last administered on 6/6/19at 05:56;


Admin Dose 1 MG;  Start 6/4/19 at 18:30


Cefazolin Sodium 50 ml @  100 mls/hr Q6 IVPB ;  Start 6/6/19 at 18:00


Levofloxacin (Levaquin) 750 mg DAILY@06 PO ;  Start 6/7/19 at 06:00











KIMMY STOVALL                   Jun 6, 2019 14:50

## 2019-06-07 VITALS — HEART RATE: 103 BPM | RESPIRATION RATE: 19 BRPM | SYSTOLIC BLOOD PRESSURE: 104 MMHG | DIASTOLIC BLOOD PRESSURE: 60 MMHG

## 2019-06-07 VITALS — SYSTOLIC BLOOD PRESSURE: 96 MMHG | HEART RATE: 96 BPM | RESPIRATION RATE: 20 BRPM | DIASTOLIC BLOOD PRESSURE: 56 MMHG

## 2019-06-07 VITALS — HEART RATE: 95 BPM | SYSTOLIC BLOOD PRESSURE: 125 MMHG | RESPIRATION RATE: 20 BRPM | DIASTOLIC BLOOD PRESSURE: 70 MMHG

## 2019-06-07 VITALS — DIASTOLIC BLOOD PRESSURE: 52 MMHG | RESPIRATION RATE: 19 BRPM | HEART RATE: 107 BPM | SYSTOLIC BLOOD PRESSURE: 97 MMHG

## 2019-06-07 LAB
ADD MAN DIFF?: NO
BASOPHIL #: 0.1 10^3/UL (ref 0–0.1)
BASOPHILS %: 0.4 % (ref 0–2)
EOSINOPHILS #: 0.2 10^3/UL (ref 0–0.5)
EOSINOPHILS %: 1.4 % (ref 0–7)
HEMATOCRIT: 31.4 % (ref 42–52)
HEMOGLOBIN: 10.5 G/DL (ref 14–18)
IMMATURE GRANS #M: 0.16 10^3/UL (ref 0–0.03)
IMMATURE GRANS % (M): 1.1 % (ref 0–0.43)
LYMPHOCYTES #: 1.8 10^3/UL (ref 0.8–2.9)
LYMPHOCYTES %: 12.3 % (ref 15–51)
MEAN CORPUSCULAR HEMOGLOBIN: 30.6 PG (ref 29–33)
MEAN CORPUSCULAR HGB CONC: 33.4 G/DL (ref 32–37)
MEAN CORPUSCULAR VOLUME: 91.5 FL (ref 82–101)
MEAN PLATELET VOLUME: 8.2 FL (ref 7.4–10.4)
MONOCYTE #: 1.3 10^3/UL (ref 0.3–0.9)
MONOCYTES %: 8.6 % (ref 0–11)
NEUTROPHIL #: 11.1 10^3/UL (ref 1.6–7.5)
NEUTROPHILS %: 76.2 % (ref 39–77)
NUCLEATED RED BLOOD CELLS #: 0 10^3/UL (ref 0–0)
NUCLEATED RED BLOOD CELLS%: 0 /100WBC (ref 0–0)
PLATELET COUNT: 395 10^3/UL (ref 140–415)
RED BLOOD COUNT: 3.43 10^6/UL (ref 4.7–6.1)
RED CELL DISTRIBUTION WIDTH: 12.8 % (ref 11.5–14.5)
WHITE BLOOD COUNT: 14.6 10^3/UL (ref 4.8–10.8)

## 2019-06-07 RX ADMIN — MORPHINE SULFATE PRN MG: 2 INJECTION, SOLUTION INTRAMUSCULAR; INTRAVENOUS at 01:18

## 2019-06-07 RX ADMIN — ENOXAPARIN SODIUM SCH MG: 100 INJECTION SUBCUTANEOUS at 06:22

## 2019-06-07 RX ADMIN — LORAZEPAM PRN MG: 2 INJECTION, SOLUTION INTRAMUSCULAR; INTRAVENOUS at 11:43

## 2019-06-07 RX ADMIN — ENOXAPARIN SODIUM 1 MG: 100 INJECTION SUBCUTANEOUS at 06:22

## 2019-06-07 RX ADMIN — CEFAZOLIN 1 MLS/HR: 1 INJECTION, POWDER, FOR SOLUTION INTRAMUSCULAR; INTRAVENOUS at 17:38

## 2019-06-07 RX ADMIN — CEFAZOLIN SCH MLS/HR: 1 INJECTION, POWDER, FOR SOLUTION INTRAMUSCULAR; INTRAVENOUS at 06:16

## 2019-06-07 RX ADMIN — MORPHINE SULFATE 1 MG: 2 INJECTION, SOLUTION INTRAMUSCULAR; INTRAVENOUS at 19:09

## 2019-06-07 RX ADMIN — MORPHINE SULFATE 1 MG: 2 INJECTION, SOLUTION INTRAMUSCULAR; INTRAVENOUS at 01:18

## 2019-06-07 RX ADMIN — CEFAZOLIN 1 MLS/HR: 1 INJECTION, POWDER, FOR SOLUTION INTRAMUSCULAR; INTRAVENOUS at 06:16

## 2019-06-07 RX ADMIN — SODIUM CHLORIDE, PRESERVATIVE FREE SCH ML: 5 INJECTION INTRAVENOUS at 08:32

## 2019-06-07 RX ADMIN — MORPHINE SULFATE PRN MG: 2 INJECTION, SOLUTION INTRAMUSCULAR; INTRAVENOUS at 19:09

## 2019-06-07 RX ADMIN — LEVOFLOXACIN SCH MG: 750 TABLET, FILM COATED ORAL at 06:16

## 2019-06-07 RX ADMIN — MORPHINE SULFATE PRN MG: 2 INJECTION, SOLUTION INTRAMUSCULAR; INTRAVENOUS at 16:00

## 2019-06-07 RX ADMIN — CEFAZOLIN SCH MLS/HR: 1 INJECTION, POWDER, FOR SOLUTION INTRAMUSCULAR; INTRAVENOUS at 17:38

## 2019-06-07 RX ADMIN — MORPHINE SULFATE 1 MG: 2 INJECTION, SOLUTION INTRAMUSCULAR; INTRAVENOUS at 16:00

## 2019-06-07 RX ADMIN — LORAZEPAM PRN MG: 2 INJECTION, SOLUTION INTRAMUSCULAR; INTRAVENOUS at 23:57

## 2019-06-07 RX ADMIN — MORPHINE SULFATE 1 MG: 2 INJECTION, SOLUTION INTRAMUSCULAR; INTRAVENOUS at 22:17

## 2019-06-07 RX ADMIN — MORPHINE SULFATE PRN MG: 2 INJECTION, SOLUTION INTRAMUSCULAR; INTRAVENOUS at 08:30

## 2019-06-07 RX ADMIN — LORAZEPAM PRN MG: 2 INJECTION, SOLUTION INTRAMUSCULAR; INTRAVENOUS at 17:52

## 2019-06-07 RX ADMIN — CEFAZOLIN SCH MLS/HR: 1 INJECTION, POWDER, FOR SOLUTION INTRAMUSCULAR; INTRAVENOUS at 11:43

## 2019-06-07 RX ADMIN — SODIUM CHLORIDE, PRESERVATIVE FREE 1 ML: 5 INJECTION INTRAVENOUS at 08:32

## 2019-06-07 RX ADMIN — MORPHINE SULFATE PRN MG: 2 INJECTION, SOLUTION INTRAMUSCULAR; INTRAVENOUS at 10:35

## 2019-06-07 RX ADMIN — LORAZEPAM 1 MG: 2 INJECTION, SOLUTION INTRAMUSCULAR; INTRAVENOUS at 17:52

## 2019-06-07 RX ADMIN — MORPHINE SULFATE PRN MG: 2 INJECTION, SOLUTION INTRAMUSCULAR; INTRAVENOUS at 22:17

## 2019-06-07 RX ADMIN — LORAZEPAM 1 MG: 2 INJECTION, SOLUTION INTRAMUSCULAR; INTRAVENOUS at 23:57

## 2019-06-07 RX ADMIN — MORPHINE SULFATE 1 MG: 2 INJECTION, SOLUTION INTRAMUSCULAR; INTRAVENOUS at 10:35

## 2019-06-07 RX ADMIN — HYDROCODONE BITARTRATE AND ACETAMINOPHEN 1 TAB: 10; 325 TABLET ORAL at 20:48

## 2019-06-07 RX ADMIN — CEFAZOLIN 1 MLS/HR: 1 INJECTION, POWDER, FOR SOLUTION INTRAMUSCULAR; INTRAVENOUS at 11:43

## 2019-06-07 RX ADMIN — CEFAZOLIN SCH MLS/HR: 1 INJECTION, POWDER, FOR SOLUTION INTRAMUSCULAR; INTRAVENOUS at 23:57

## 2019-06-07 RX ADMIN — HYDROCODONE BITARTRATE AND ACETAMINOPHEN 1 TAB: 10; 325 TABLET ORAL at 03:43

## 2019-06-07 RX ADMIN — MORPHINE SULFATE 1 MG: 2 INJECTION, SOLUTION INTRAMUSCULAR; INTRAVENOUS at 08:30

## 2019-06-07 RX ADMIN — CEFAZOLIN 1 MLS/HR: 1 INJECTION, POWDER, FOR SOLUTION INTRAMUSCULAR; INTRAVENOUS at 23:57

## 2019-06-07 RX ADMIN — LEVOFLOXACIN 1 MG: 750 TABLET, FILM COATED ORAL at 06:16

## 2019-06-07 RX ADMIN — LORAZEPAM 1 MG: 2 INJECTION, SOLUTION INTRAMUSCULAR; INTRAVENOUS at 11:43

## 2019-06-07 NOTE — CONS
Assessment/Plan


Assessment/Plan


Hospital Course (Demo Recall)


1200


Patient is alert feels better looks comfortable no fevers overnight T-max 100.1 


WBC 14.6 platelets 395 no shift no bands BUN 3 creatinine 0.65





Antimicrobials: Levaquin Ancef





Chest x-ray this morning revealed no acute pulmonary abnormality





Microbiology: Blood cultures remain negative, left hip wound culture grew 


oxacillin sensitive staph aureus





Antimicrobials: Vanco Zosyn





PHYSICAL EXAMINATION:


GENERAL:  He is alert, responsive, in no acute distress.


SKIN:  Without generalized rash.


HEENT:  Within normal limits.


NECK:  Supple.


LYMPH NODES:  None palpable.


CHEST:  Decreased breath sounds at the bases.


HEART:  Without murmur or gallop.


ABDOMEN:  Soft, nontender without organosplenomegaly or masses.





Assessment:


1.  Systemic inflammatory response syndrome


2.  Community-acquired pneumonia


3.  Left hip abscess, status post I&D 


4.  Heroine abuse





Plan: Patient is improving, continue present care and antibiotics, local wound 


care per surgical and nursing team, anticipate discharge on oral Keflex and 


Levaquin





Consultation Date/Type/Reason


Admit Date/Time


Jose 3, 2019 at 02:21


Initial Consult Date


6/3/19


Type of Consult


id


Date/Time of Note


DATE: 6/7/19 


TIME: 18:02





Exam/Review of Systems


Exam


Vitals





Vital Signs


  Date      Temp  Pulse  Resp  B/P (MAP)   Pulse Ox  O2          O2 Flow    FiO2


Time                                                 Delivery    Rate


    6/7/19  99.4     95    20      125/70       100


     15:11                           (88)


    6/6/19                                           Room Air


     13:43


    6/3/19                                                             6.0


     18:37








Intake and Output





6/6/19 6/6/19 6/7/19





1515:00


23:00


07:00





IntakeIntake Total


1070 ml


810 ml


100 ml





OutputOutput Total


400 ml


300 ml





BalanceBalance


670 ml


810 ml


-200 ml














Results


Result Diagram:  


6/7/19 0440                                                                     


          6/6/19 0501





Results 24hrs





Laboratory Tests


               Test
                                6/7/19
04:40


               White Blood Count                         14.6  H


               Red Blood Count                           3.43  L


               Hemoglobin                                10.5  L


               Hematocrit                                31.4  L


               Mean Corpuscular Volume                    91.5


               Mean Corpuscular Hemoglobin                30.6


               Mean Corpuscular Hemoglobin
Concent       33.4  



               Red Cell Distribution Width                12.8


               Platelet Count                              395


               Mean Platelet Volume                        8.2


               Immature Granulocytes %                  1.100  H


               Neutrophils %                              76.2


               Lymphocytes %                             12.3  L


               Monocytes %                                 8.6


               Eosinophils %                               1.4


               Basophils %                                 0.4


               Nucleated Red Blood Cells %                 0.0


               Immature Granulocytes #                  0.160  H


               Neutrophils #                             11.1  H


               Lymphocytes #                               1.8


               Monocytes #                                1.3  H


               Eosinophils #                               0.2


               Basophils #                                 0.1


               Nucleated Red Blood Cells #                 0.0








Medications


Medication





Current Medications


IV Flush (NS 3 ml) 3 ml PER PROTOCOL IV  Last administered on 6/7/19at 08:32; 


Admin Dose 3 ML;  Start 6/3/19 at 03:00


Albuterol/ Ipratropium (Duoneb) 3 ml Q4H RESP THERAPY  PRN HHN SHORTNESS OF ISABELLA


TH;  Start 6/3/19 at 04:00


Acetaminophen/ Hydrocodone Bitart (Norco (10/325)) 1 tab Q6H  PRN PO PAIN Last 


administered on 6/7/19at 03:43; Admin Dose 1 TAB;  Start 6/3/19 at 18:00


Acetaminophen (Tylenol Tab) 650 mg Q6H  PRN PO MILD PAIN(1-3)OR ELEVATED TEMP;  


Start 6/3/19 at 18:00


Ondansetron HCl (Zofran Inj) 4 mg Q6H  PRN IV NAUSEA AND/OR VOMITING;  Start 


6/3/19 at 18:00


Enoxaparin Sodium (Lovenox) 40 mg DAILY@07 SC  Last administered on 6/7/19at 


06:22; Admin Dose 40 MG;  Start 6/4/19 at 07:00


Lorazepam (Ativan) 1 mg Q6H  PRN IV ANXIETY Last administered on 6/7/19at 17:52;


Admin Dose 1 MG;  Start 6/4/19 at 18:30


Cefazolin Sodium 50 ml @  100 mls/hr Q6 IVPB  Last administered on 6/7/19at 17:3


8; Admin Dose 100 MLS/HR;  Start 6/6/19 at 18:00


Levofloxacin (Levaquin) 750 mg DAILY@06 PO  Last administered on 6/7/19at 06:16;


Admin Dose 750 MG;  Start 6/7/19 at 06:00


Morphine Sulfate (morphine) 2 mg Q3  PRN IV PAIN LEVEL 6-10 Last administered on


6/7/19at 16:00; Admin Dose 2 MG;  Start 6/7/19 at 14:30











JOSE CARLOS NP             Jun 7, 2019 18:04

## 2019-06-07 NOTE — PN
Date/Time of Note


Date/Time of Note


DATE: 6/7/19 


TIME: 14:20





Assessment/Plan


VTE Prophylaxis


Risk score (from Ns)>0 risk:  1


SCD applied (from Ns):  Yes


Pharmacological prophylaxis:  NA/contraindicated


Pharm contraindication:  low risk/ambulating





Lines/Catheters


IV Catheter Type (from Nrs):  Saline Lock


Urinary Cath still in place:  No





Assessment/Plan


Hospital Course


37 yo heroin addict presents with L hip abscess from intramuscular injection.





#Sepsis secondary to left hip abscess


-CT is consistent with abscess


-Status post surgical drainage with surgery


- IV vanco, zosyn


-Wound culture currently growing staph


-ID consultation appreciated


-Wound care





#Asthma


- Albuterol prn





#Heroin use


- Patient is motivated to quit, social work consult appreciated, patient will be


placed in a drug rehab program after DC





DVT: SCDs


GI: None





DC planning: Family requesting placement in a drug rehab program but patient 


cannot be accepted with current wounds, possible nursing home placement


Result Diagram:  


6/7/19 0440                                                                     


          6/6/19 0501





Results 24hrs





Laboratory Tests


               Test
                                6/7/19
04:40


               White Blood Count                         14.6  H


               Red Blood Count                           3.43  L


               Hemoglobin                                10.5  L


               Hematocrit                                31.4  L


               Mean Corpuscular Volume                    91.5


               Mean Corpuscular Hemoglobin                30.6


               Mean Corpuscular Hemoglobin
Concent       33.4  



               Red Cell Distribution Width                12.8


               Platelet Count                              395


               Mean Platelet Volume                        8.2


               Immature Granulocytes %                  1.100  H


               Neutrophils %                              76.2


               Lymphocytes %                             12.3  L


               Monocytes %                                 8.6


               Eosinophils %                               1.4


               Basophils %                                 0.4


               Nucleated Red Blood Cells %                 0.0


               Immature Granulocytes #                  0.160  H


               Neutrophils #                             11.1  H


               Lymphocytes #                               1.8


               Monocytes #                                1.3  H


               Eosinophils #                               0.2


               Basophils #                                 0.1


               Nucleated Red Blood Cells #                 0.0








Subjective


24 Hr Interval Summary


Constitutional:  no complaints





Exam/Review of Systems


Exam


Vitals





Vital Signs


  Date      Temp  Pulse  Resp  B/P (MAP)   Pulse Ox  O2          O2 Flow    FiO2


Time                                                 Delivery    Rate


    6/7/19  98.9     96    20  96/56 (69)        97


     08:10


    6/6/19                                           Room Air


     13:43


    6/3/19                                                             6.0


     18:37








Intake and Output





6/6/19 6/6/19 6/7/19





1515:00


23:00


07:00





IntakeIntake Total


1070 ml


810 ml


100 ml





OutputOutput Total


400 ml


300 ml





BalanceBalance


670 ml


810 ml


-200 ml











Constitutional:  alert, oriented


Respiratory:  clear to auscultation


Cardiovascular:  regular rate and rhythm


Gastrointestinal:  soft; 


   No distended


Musculoskeletal:  nl extremities to inspection





Results


Results 24hrs





Laboratory Tests


               Test
                                6/7/19
04:40


               White Blood Count                         14.6  H


               Red Blood Count                           3.43  L


               Hemoglobin                                10.5  L


               Hematocrit                                31.4  L


               Mean Corpuscular Volume                    91.5


               Mean Corpuscular Hemoglobin                30.6


               Mean Corpuscular Hemoglobin
Concent       33.4  



               Red Cell Distribution Width                12.8


               Platelet Count                              395


               Mean Platelet Volume                        8.2


               Immature Granulocytes %                  1.100  H


               Neutrophils %                              76.2


               Lymphocytes %                             12.3  L


               Monocytes %                                 8.6


               Eosinophils %                               1.4


               Basophils %                                 0.4


               Nucleated Red Blood Cells %                 0.0


               Immature Granulocytes #                  0.160  H


               Neutrophils #                             11.1  H


               Lymphocytes #                               1.8


               Monocytes #                                1.3  H


               Eosinophils #                               0.2


               Basophils #                                 0.1


               Nucleated Red Blood Cells #                 0.0








Medications


Medication





Current Medications


IV Flush (NS 3 ml) 3 ml PER PROTOCOL IV  Last administered on 6/7/19at 08:32; 


Admin Dose 3 ML;  Start 6/3/19 at 03:00


Albuterol/ Ipratropium (Duoneb) 3 ml Q4H RESP THERAPY  PRN HHN SHORTNESS OF B


REATH;  Start 6/3/19 at 04:00


Morphine Sulfate (morphine) 2 mg Q2H  PRN IV PAIN LEVEL 6-10 Last administered 


on 6/7/19at 10:35; Admin Dose 2 MG;  Start 6/3/19 at 18:00


Acetaminophen/ Hydrocodone Bitart (Norco (10/325)) 1 tab Q6H  PRN PO PAIN Last 


administered on 6/7/19at 03:43; Admin Dose 1 TAB;  Start 6/3/19 at 18:00


Acetaminophen (Tylenol Tab) 650 mg Q6H  PRN PO MILD PAIN(1-3)OR ELEVATED TEMP;  


Start 6/3/19 at 18:00


Ondansetron HCl (Zofran Inj) 4 mg Q6H  PRN IV NAUSEA AND/OR VOMITING;  Start 6


/3/19 at 18:00


Enoxaparin Sodium (Lovenox) 40 mg DAILY@07 SC  Last administered on 6/7/19at 


06:22; Admin Dose 40 MG;  Start 6/4/19 at 07:00


Lorazepam (Ativan) 1 mg Q6H  PRN IV ANXIETY Last administered on 6/7/19at 11:43;


Admin Dose 1 MG;  Start 6/4/19 at 18:30


Cefazolin Sodium 50 ml @  100 mls/hr Q6 IVPB  Last administered on 6/7/19at 


11:43; Admin Dose 100 MLS/HR;  Start 6/6/19 at 18:00


Levofloxacin (Levaquin) 750 mg DAILY@06 PO  Last administered on 6/7/19at 06:16;


Admin Dose 750 MG;  Start 6/7/19 at 06:00











KIMMY STOVALL                   Jun 7, 2019 14:21

## 2019-06-08 VITALS — DIASTOLIC BLOOD PRESSURE: 58 MMHG | HEART RATE: 104 BPM | RESPIRATION RATE: 19 BRPM | SYSTOLIC BLOOD PRESSURE: 103 MMHG

## 2019-06-08 VITALS — HEART RATE: 116 BPM | RESPIRATION RATE: 19 BRPM | SYSTOLIC BLOOD PRESSURE: 106 MMHG | DIASTOLIC BLOOD PRESSURE: 61 MMHG

## 2019-06-08 VITALS — RESPIRATION RATE: 18 BRPM | HEART RATE: 112 BPM | SYSTOLIC BLOOD PRESSURE: 103 MMHG | DIASTOLIC BLOOD PRESSURE: 58 MMHG

## 2019-06-08 RX ADMIN — LORAZEPAM PRN MG: 2 INJECTION, SOLUTION INTRAMUSCULAR; INTRAVENOUS at 12:34

## 2019-06-08 RX ADMIN — SODIUM CHLORIDE, PRESERVATIVE FREE 1 ML: 5 INJECTION INTRAVENOUS at 11:46

## 2019-06-08 RX ADMIN — MORPHINE SULFATE 1 MG: 2 INJECTION, SOLUTION INTRAMUSCULAR; INTRAVENOUS at 03:32

## 2019-06-08 RX ADMIN — LEVOFLOXACIN SCH MG: 750 TABLET, FILM COATED ORAL at 06:15

## 2019-06-08 RX ADMIN — MORPHINE SULFATE 1 MG: 2 INJECTION, SOLUTION INTRAMUSCULAR; INTRAVENOUS at 07:41

## 2019-06-08 RX ADMIN — LORAZEPAM PRN MG: 2 INJECTION, SOLUTION INTRAMUSCULAR; INTRAVENOUS at 06:20

## 2019-06-08 RX ADMIN — CEFAZOLIN SCH MLS/HR: 1 INJECTION, POWDER, FOR SOLUTION INTRAMUSCULAR; INTRAVENOUS at 11:46

## 2019-06-08 RX ADMIN — ENOXAPARIN SODIUM 1 MG: 100 INJECTION SUBCUTANEOUS at 06:16

## 2019-06-08 RX ADMIN — LEVOFLOXACIN 1 MG: 750 TABLET, FILM COATED ORAL at 06:15

## 2019-06-08 RX ADMIN — HYDROCODONE BITARTRATE AND ACETAMINOPHEN 1 TAB: 10; 325 TABLET ORAL at 04:05

## 2019-06-08 RX ADMIN — MORPHINE SULFATE PRN MG: 2 INJECTION, SOLUTION INTRAMUSCULAR; INTRAVENOUS at 07:41

## 2019-06-08 RX ADMIN — CEFAZOLIN 1 MLS/HR: 1 INJECTION, POWDER, FOR SOLUTION INTRAMUSCULAR; INTRAVENOUS at 06:15

## 2019-06-08 RX ADMIN — LORAZEPAM 1 MG: 2 INJECTION, SOLUTION INTRAMUSCULAR; INTRAVENOUS at 06:20

## 2019-06-08 RX ADMIN — CEFAZOLIN 1 MLS/HR: 1 INJECTION, POWDER, FOR SOLUTION INTRAMUSCULAR; INTRAVENOUS at 11:46

## 2019-06-08 RX ADMIN — MORPHINE SULFATE 1 MG: 2 INJECTION, SOLUTION INTRAMUSCULAR; INTRAVENOUS at 11:39

## 2019-06-08 RX ADMIN — MORPHINE SULFATE PRN MG: 2 INJECTION, SOLUTION INTRAMUSCULAR; INTRAVENOUS at 03:32

## 2019-06-08 RX ADMIN — MORPHINE SULFATE PRN MG: 2 INJECTION, SOLUTION INTRAMUSCULAR; INTRAVENOUS at 11:39

## 2019-06-08 RX ADMIN — LORAZEPAM 1 MG: 2 INJECTION, SOLUTION INTRAMUSCULAR; INTRAVENOUS at 12:34

## 2019-06-08 RX ADMIN — ENOXAPARIN SODIUM SCH MG: 100 INJECTION SUBCUTANEOUS at 06:16

## 2019-06-08 RX ADMIN — SODIUM CHLORIDE, PRESERVATIVE FREE SCH ML: 5 INJECTION INTRAVENOUS at 11:46

## 2019-06-08 RX ADMIN — CEFAZOLIN SCH MLS/HR: 1 INJECTION, POWDER, FOR SOLUTION INTRAMUSCULAR; INTRAVENOUS at 06:15

## 2019-06-08 NOTE — DS
Date/Time of Note


Date/Time of Note


DATE: 6/8/19 


TIME: 17:28





Discharge Summary


Admission/Discharge Info


Admit Date/Time


Jose 3, 2019 at 02:21


Discharge Date/Time


Jun 8, 2019 at 14:45


Discharge Diagnosis


Patient left AMA





39 yo heroin addict presents with L hip abscess from intramuscular injection.





#Sepsis secondary to left hip abscess


-CT is consistent with abscess


-Status post surgical drainage with surgery


- IV vanco, zosyn


-Wound culture currently growing staph


-ID consultation appreciated


-Wound care





#Asthma


- Albuterol prn





#Heroin use


- Patient is motivated to quit, social work consult appreciated, patient will be


placed in a drug rehab program after DC


Patient Condition:  Fair


Hospital Course


Patient is a 39 yo heroin addict presents with L hip abscess from intramuscular 


injection.  CT did show abscess, patient underwent surgical drainage received IV


antibiotics.  Patient was seen by ID and wound care.  Family want to get patient


in a drug rehab program but they would not accept him with his wound.  Case 


manager was attempting placement in a skilled nursing facility when he decided 


to leave AMA.


Home Meds


Active Scripts


Amoxicillin* (Amoxicillin*) 500 Mg Cap, 500 MG PO BID for 7 Days, CAP


   Prov:ARNAV ROTHMAN         2/28/18


Ibuprofen* (Motrin*) 600 Mg Tab, 600 MG PO Q6, #30 TAB


   Prov:ARNAV ROTHMAN         2/28/18


Primary Care Provider


Care Physician No Primary


Time spent on discharge:  < 30 minutes











KIMMY STOVALL                   Jun 8, 2019 17:29

## 2019-06-10 ENCOUNTER — HOSPITAL ENCOUNTER (INPATIENT)
Dept: HOSPITAL 10 - E/R | Age: 39
LOS: 5 days | Discharge: HOME | DRG: 603 | End: 2019-06-15
Payer: COMMERCIAL

## 2019-06-10 ENCOUNTER — HOSPITAL ENCOUNTER (INPATIENT)
Dept: HOSPITAL 91 - E/R | Age: 39
LOS: 5 days | Discharge: HOME | DRG: 603 | End: 2019-06-15
Payer: COMMERCIAL

## 2019-06-10 VITALS — HEART RATE: 103 BPM | SYSTOLIC BLOOD PRESSURE: 104 MMHG | RESPIRATION RATE: 20 BRPM | DIASTOLIC BLOOD PRESSURE: 62 MMHG

## 2019-06-10 VITALS
HEIGHT: 72 IN | HEIGHT: 72 IN | BODY MASS INDEX: 19.95 KG/M2 | WEIGHT: 147.27 LBS | WEIGHT: 147.27 LBS | BODY MASS INDEX: 19.95 KG/M2

## 2019-06-10 DIAGNOSIS — F41.9: ICD-10-CM

## 2019-06-10 DIAGNOSIS — E87.5: ICD-10-CM

## 2019-06-10 DIAGNOSIS — D64.9: ICD-10-CM

## 2019-06-10 DIAGNOSIS — F17.200: ICD-10-CM

## 2019-06-10 DIAGNOSIS — Z59.0: ICD-10-CM

## 2019-06-10 DIAGNOSIS — F32.9: ICD-10-CM

## 2019-06-10 DIAGNOSIS — F11.90: ICD-10-CM

## 2019-06-10 DIAGNOSIS — J45.909: ICD-10-CM

## 2019-06-10 DIAGNOSIS — L02.31: Primary | ICD-10-CM

## 2019-06-10 LAB
ABNORMAL IP MESSAGE: 1
ADD MAN DIFF?: NO
ALANINE AMINOTRANSFERASE: 97 IU/L (ref 13–69)
ALBUMIN/GLOBULIN RATIO: 0.87
ALBUMIN: 3.5 G/DL (ref 3.3–4.9)
ALKALINE PHOSPHATASE: 137 IU/L (ref 42–121)
ANION GAP: 8 (ref 5–13)
ASPARTATE AMINO TRANSFERASE: 59 IU/L (ref 15–46)
BASOPHIL #: 0.1 10^3/UL (ref 0–0.1)
BASOPHILS %: 0.4 % (ref 0–2)
BILIRUBIN,DIRECT: 0 MG/DL (ref 0–0.2)
BILIRUBIN,TOTAL: 0.3 MG/DL (ref 0.2–1.3)
BLOOD UREA NITROGEN: 18 MG/DL (ref 7–20)
CALCIUM: 8.6 MG/DL (ref 8.4–10.2)
CARBON DIOXIDE: 32 MMOL/L (ref 21–31)
CHLORIDE: 96 MMOL/L (ref 97–110)
CREATININE: 0.86 MG/DL (ref 0.61–1.24)
EOSINOPHILS #: 0.2 10^3/UL (ref 0–0.5)
EOSINOPHILS %: 0.8 % (ref 0–7)
ERYTHROCYTE SEDIMENTATION RATE: 70 MM/HR (ref 0–15)
GLOBULIN: 4 G/DL (ref 1.3–3.2)
GLUCOSE: 75 MG/DL (ref 70–220)
HEMATOCRIT: 32.9 % (ref 42–52)
HEMOGLOBIN: 10.7 G/DL (ref 14–18)
IMMATURE GRANS #M: 0.26 10^3/UL (ref 0–0.03)
IMMATURE GRANS % (M): 1.3 % (ref 0–0.43)
INR: 1.07
LIPASE: 46 U/L (ref 23–300)
LYMPHOCYTES #: 2.2 10^3/UL (ref 0.8–2.9)
LYMPHOCYTES %: 10.4 % (ref 15–51)
MEAN CORPUSCULAR HEMOGLOBIN: 30.2 PG (ref 29–33)
MEAN CORPUSCULAR HGB CONC: 32.5 G/DL (ref 32–37)
MEAN CORPUSCULAR VOLUME: 92.9 FL (ref 82–101)
MEAN PLATELET VOLUME: 7.9 FL (ref 7.4–10.4)
MONOCYTE #: 1.8 10^3/UL (ref 0.3–0.9)
MONOCYTES %: 8.8 % (ref 0–11)
NEUTROPHIL #: 16.3 10^3/UL (ref 1.6–7.5)
NEUTROPHILS %: 78.3 % (ref 39–77)
NUCLEATED RED BLOOD CELLS #: 0 10^3/UL (ref 0–0)
NUCLEATED RED BLOOD CELLS%: 0 /100WBC (ref 0–0)
PARTIAL THROMBOPLASTIN TIME: 34.6 SEC (ref 23–35)
PLATELET COUNT: 446 10^3/UL (ref 140–415)
POSITIVE DIFF: (no result)
POTASSIUM: 3.9 MMOL/L (ref 3.5–5.1)
PROTIME: 14 SEC (ref 11.9–14.9)
PT RATIO: 1.1
RED BLOOD COUNT: 3.54 10^6/UL (ref 4.7–6.1)
RED CELL DISTRIBUTION WIDTH: 13 % (ref 11.5–14.5)
SODIUM: 136 MMOL/L (ref 135–144)
TOTAL PROTEIN: 7.5 G/DL (ref 6.1–8.1)
WHITE BLOOD COUNT: 20.8 10^3/UL (ref 4.8–10.8)

## 2019-06-10 PROCEDURE — 86803 HEPATITIS C AB TEST: CPT

## 2019-06-10 PROCEDURE — 99285 EMERGENCY DEPT VISIT HI MDM: CPT

## 2019-06-10 PROCEDURE — 85730 THROMBOPLASTIN TIME PARTIAL: CPT

## 2019-06-10 PROCEDURE — 86704 HEP B CORE ANTIBODY TOTAL: CPT

## 2019-06-10 PROCEDURE — 83735 ASSAY OF MAGNESIUM: CPT

## 2019-06-10 PROCEDURE — 80202 ASSAY OF VANCOMYCIN: CPT

## 2019-06-10 PROCEDURE — 85651 RBC SED RATE NONAUTOMATED: CPT

## 2019-06-10 PROCEDURE — 83690 ASSAY OF LIPASE: CPT

## 2019-06-10 PROCEDURE — 82728 ASSAY OF FERRITIN: CPT

## 2019-06-10 PROCEDURE — 87070 CULTURE OTHR SPECIMN AEROBIC: CPT

## 2019-06-10 PROCEDURE — 85025 COMPLETE CBC W/AUTO DIFF WBC: CPT

## 2019-06-10 PROCEDURE — 36415 COLL VENOUS BLD VENIPUNCTURE: CPT

## 2019-06-10 PROCEDURE — 83540 ASSAY OF IRON: CPT

## 2019-06-10 PROCEDURE — 87081 CULTURE SCREEN ONLY: CPT

## 2019-06-10 PROCEDURE — 84100 ASSAY OF PHOSPHORUS: CPT

## 2019-06-10 PROCEDURE — 86140 C-REACTIVE PROTEIN: CPT

## 2019-06-10 PROCEDURE — 86706 HEP B SURFACE ANTIBODY: CPT

## 2019-06-10 PROCEDURE — 85610 PROTHROMBIN TIME: CPT

## 2019-06-10 PROCEDURE — 80053 COMPREHEN METABOLIC PANEL: CPT

## 2019-06-10 PROCEDURE — 87340 HEPATITIS B SURFACE AG IA: CPT

## 2019-06-10 PROCEDURE — 76536 US EXAM OF HEAD AND NECK: CPT

## 2019-06-10 PROCEDURE — 87040 BLOOD CULTURE FOR BACTERIA: CPT

## 2019-06-10 RX ADMIN — IBUPROFEN 1 MG: 600 TABLET ORAL at 19:49

## 2019-06-10 RX ADMIN — VANCOMYCIN HYDROCHLORIDE 1 MLS/HR: 1 INJECTION, POWDER, LYOPHILIZED, FOR SOLUTION INTRAVENOUS at 20:52

## 2019-06-10 RX ADMIN — PIPERACILLIN SODIUM AND TAZOBACTAM SODIUM 1 MLS/HR: 3; .375 INJECTION, POWDER, LYOPHILIZED, FOR SOLUTION INTRAVENOUS at 19:49

## 2019-06-10 RX ADMIN — ONDANSETRON HYDROCHLORIDE 1 MG: 2 INJECTION, SOLUTION INTRAMUSCULAR; INTRAVENOUS at 19:49

## 2019-06-10 RX ADMIN — CEFTRIAXONE 1 MLS/HR: 1 INJECTION, SOLUTION INTRAVENOUS at 20:14

## 2019-06-10 RX ADMIN — THIAMINE HYDROCHLORIDE 1 MLS/HR: 100 INJECTION, SOLUTION INTRAMUSCULAR; INTRAVENOUS at 19:49

## 2019-06-10 SDOH — ECONOMIC STABILITY - HOUSING INSECURITY: HOMELESSNESS: Z59.0

## 2019-06-10 NOTE — HP
Date/Time of Note


Date/Time of Note


DATE: 6/10/19 


TIME: 20:40





Assessment/Plan


VTE Prophylaxis


SCD applied (from Nsg):  Yes


Pharmacological prophylaxis:  NA/contraindicated


Pharm contraindication:  low risk/ambulating





Lines/Catheters


IV Catheter Type (from Nrsg):  Saline Lock





Assessment/Plan


Hospital Course


This is a 38-year-old male being admitted to the [facility] floor for:





1. Left gluteal abscess: We will order an ultrasound of the left buttocks for 


further evaluation.  Follow-up blood culture results.  Broad-spectrum 


antibiotics of vancomycin and Zosyn.  ED spoke with Dr. Magdaleno who performed 


previous I&D.





2. Asthma: Currently no acute issues, PRN albuterol as needed.





3. Transaminitis: monitor for now, no ruq pain. if persistent, further work up. 





4. Heroin use: PRN Ativan for withdrawal symptoms.  Patient continues to use 


despite previous mention of wanting to quit.  Social work consult.





5. DVT and GI prophylaxis: SCDs, no GI prophylaxis indicated





Further treatment strategy will be implemented as per the clinical course


Result Diagram:  


6/10/19 1939                                                                    


           6/10/19 1939





Results 24hrs





Laboratory Tests


               Test
                                6/10/19
19:39


               White Blood Count                         20.8  #H


               Red Blood Count                            3.54  L


               Hemoglobin                                 10.7  L


               Hematocrit                                 32.9  L


               Mean Corpuscular Volume                     92.9


               Mean Corpuscular Hemoglobin                 30.2


               Mean Corpuscular Hemoglobin
Concent        32.5  



               Red Cell Distribution Width                 13.0


               Platelet Count                              446  H


               Mean Platelet Volume                         7.9


               Immature Granulocytes %                   1.300  H


               Neutrophils %                              78.3  H


               Lymphocytes %                              10.4  L


               Monocytes %                                  8.8


               Eosinophils %                                0.8


               Basophils %                                  0.4


               Nucleated Red Blood Cells %                  0.0


               Immature Granulocytes #                   0.260  H


               Neutrophils #                              16.3  H


               Lymphocytes #                                2.2


               Monocytes #                                 1.8  H


               Eosinophils #                                0.2


               Basophils #                                  0.1


               Nucleated Red Blood Cells #                  0.0


               Prothrombin Time                            14.0


               Prothrombin Time Ratio                       1.1


               INR International Normalized
Ratio         1.07  



               Activated Partial
Thromboplast Time        34.6  



               Sodium Level                                 136


               Potassium Level                              3.9


               Chloride Level                               96  L


               Carbon Dioxide Level                         32  H


               Anion Gap                                      8


               Blood Urea Nitrogen                           18


               Creatinine                                  0.86


               Est Glomerular Filtrat Rate
mL/min   > 60  



               Glucose Level                                 75


               Calcium Level                                8.6


               Total Bilirubin                              0.3


               Direct Bilirubin                            0.00


               Indirect Bilirubin                           0.3


               Aspartate Amino Transf
(AST/SGOT)           59  H



               Alanine Aminotransferase
(ALT/SGPT)         97  H



               Alkaline Phosphatase                        137  H


               Total Protein                                7.5


               Albumin                                      3.5


               Globulin                                   4.00  H


               Albumin/Globulin Ratio                      0.87


               Lipase                                        46








HPI/ROS


Admit Date/Time


Admit Date/Time








Hx of Present Illness


Chief complaint: Left buttock pain, swelling





This is a 38-year-old heroin drug abuser presenting with continued left buttock 


pain, redness, swelling.  She was admitted for left gluteal abscess which he had


OR drainage for by Dr. Magdaleno.  Patient was also on antibiotics.  He left the 


hospital AGAINST MEDICAL ADVICE.  He states that over the last few days he has 


started noticing increased swelling of the left buttocks and tightness.  He 


denies any fevers.  He is currently not on any antibiotics.  He denies any 


fevers or chills or nausea vomiting or diarrhea.  He did report that he uses 


heroin but that he did not inject in the left gluteal area.  He did have a wound


culture during his previous admission that grew staph aureus.





Allergies: NKDA





Medications: None





ROS


Const: As per HPI


Eyes : No pain discharge or redness or change in visual acuity


ENT: No pain, sore throat, congestion, congestion,  dysphagia or discharge


Respiratory: No shortness of breath, cough, sputum, wheezing, or pleuritic pain


Cardiovascular: No chest pain, palpitation, PND, or edema


GI : no change in appetite, abdominal pain, nausea, vomiting, diarrhea, 


constipation, or change in the color his stool 


Genitourinary: No dysuria, hematuria, flank pain ,  discharge or CVA tenderness


Musculoskeletal: No joint pain, back pain, neck pain, restricted range of motion


in neck or joints


Skin: As per HPI


Neuro: No headache, dizziness, syncope, seizure, focal weakness


Endocrine: No polyuria, polydipsia, temperature intolerance


Psych: No hallucination, depression, anxiety or suicidal ideation





PMH/Family/Social


Past Medical History


Asthma


Medications





Current Medications


Vancomycin HCl 250 ml @  125 mls/hr ONCE  ONCE IVPB ;  Start 6/10/19 at 19:30;  


Stop 6/10/19 at 21:29


Sodium Chloride 1,000 ml @  70 mls/hr [id number] IV ;  Start 6/11/19 at 00:00


IV Flush (NS 3 ml) 3 ml PER PROTOCOL IV ;  Start 6/10/19 at 20:30


Ondansetron HCl (Zofran Inj) 4 mg Q6H  PRN IV NAUSEA/VOMITING;  Start 6/10/19 at


20:30


Acetaminophen (Tylenol Tab) 650 mg Q6H  PRN PO .PAIN 1-3 OR TEMP;  Start 6/10/19


at 20:30


Acetaminophen/ Hydrocodone Bitart (Norco (5/325)) 1 tab Q6H  PRN PO .MOD PAIN 4-


6;  Start 6/10/19 at 20:30


Morphine Sulfate (morphine) 2 mg Q4H  PRN IV .SEVERE PAIN 7-10;  Start 6/10/19 


at 20:30


Docusate Sodium (Colace) 100 mg Q12H  PRN PO .CONSTIPATION;  Start 6/10/19 at 


20:30


Bisacodyl (Dulcolax) 5 mg DAILY  PRN PO .CONSTIPATION;  Start 6/10/19 at 20:30


Coded Allergies:  


     No Known Drug Allergies (Verified  Allergy, Unknown, 2/28/18)





Past Surgical History


L hand surgery after trauma


basilar skull fracture s/p repair


Past Surgical Hx:  other





Family History


Significant Family History:  no pertinent family hx





Social History


Alcohol Use:  other (Unknown)


Smoking Status:  Current every day smoker


Drug Use:  heroin





Exam/Review of Systems


Vital Signs


Vitals





Vital Signs


  Date      Temp  Pulse  Resp  B/P (MAP)   Pulse Ox  O2          O2 Flow    FiO2


Time                                                 Delivery    Rate


   6/10/19          115    20      110/68       100  Room Air


     20:00                           (82)


   6/10/19  99.4


     16:34








Exam


Exam





General: Patient is a pleasant male currently lying in bed in no acute distress


HEENT: Atraumatic, normocephalic. The pupils are equal, round and reactive. 


Extraocular motor are intact


Neck: Supple with full range of motion. No rigidity or meningismus


Chest: Nontender


Lungs: Clear to auscultation bilaterally no crackles rales or wheezing


Heart: Normal S1-S2, Regular rhythm and rate. No murmur, S3, or S4


Abdomen: Soft , nontender,  nondistended , bowel sounds are present. No guarding


no rebound tenderness , No masses or organomegaly. No costovertebral temporal 


angle mass


Extremities: Normal to inspection, no edema no cyanosis


Skin: Significant left gluteal swelling and induration noted, approximately 7 x 


7 cm.


Neurologic: Normal mental status, speech normal, cranial nerves II through XII 


are intact, motor and sensory are intact, no focal weakness











JAM VINSON                 Jose 10, 2019 20:40

## 2019-06-10 NOTE — ERD
ER Documentation


Chief Complaint


Chief Complaint





possible infected in hip, AMA fr floor





HPI


This is a 38-year-old heroin drug abuser presenting with continued left buttock 


pain, redness, swelling.  He states he left the hospital AGAINST MEDICAL ADVICE 


about a week ago shortly after OR incisional drainage of his left gluteal 


abscess.  He states since discharge he has continued to use heroin although has 


not injected his left side.  He denies fevers or chills, no chest pain or 


shortness of breath.





ROS


All systems reviewed and are negative except as per history of present illness.





Medications


Home Meds


Active Scripts


Amoxicillin* (Amoxicillin*) 500 Mg Cap, 500 MG PO BID for 7 Days, CAP


   Prov:LORNA,ARNAV C         2/28/18


Ibuprofen* (Motrin*) 600 Mg Tab, 600 MG PO Q6, #30 TAB


   Prov:LORNA,ARNAV C         2/28/18





Allergies


Allergies:  


Coded Allergies:  


     No Known Drug Allergies (Verified  Allergy, Unknown, 2/28/18)





PMhx/Soc


Heroin abuse, recent left gluteal abscess status post OR incision and drainage 


last week


History of Surgery:  Yes (Lt hand sx 2005)


Anesthesia Reaction:  No


Hx Neurological Disorder:  No


Hx Respiratory Disorders:  No


Hx Cardiac Disorders:  No


Hx Psychiatric Problems:  Yes (anxiety/depression)


Hx Miscellaneous Medical Probl:  No


Hx Alcohol Use:  No (quit 15 yrs ago)


Hx Substance Use:  Yes (heroin use on &off x27 yrs)


Hx Tobacco Use:  Yes


Smoking Status:  Current every day smoker





FmHx


Family History:  No diabetes





Physical Exam


Vitals





Vital Signs


  Date      Temp  Pulse  Resp  B/P (MAP)   Pulse Ox  O2          O2 Flow    FiO2


Time                                                 Delivery    Rate


   6/10/19  99.4    109    18      107/68        97


     16:34                           (81)





Physical Exam


GENERAL: Well-developed, well-nourished, appears dehydrated, moderate 


discomfort, afebrile


HEENT: Dry mucous membranes, pink conjunctiva, no cervical spine tenderness or 


step-off deformities, 


CARDIAC: Tachycardic and regular, no murmurs rubs or gallops


LUNGS: Clear bilaterally no wheezing crackles or stridor


ABDOMEN: Soft nontender, no guarding, no rigidity, no rebound, no psoas sign no 


obturator sign. 


SKIN: Warm and dry to touch, large zone of indurated erythematous skin with a 


central deep left gluteal abscess, recent surgical sites are noted no purulent 


discharge from those sites


EXTREMITIES: No clubbing cyanosis or edema, calves are bilaterally symmetrical, 


no Homans sign, no popliteal cord sign. Distal pulses equal and bilateral


PSYCH: Normal affect without agitation or irritability


Result Diagram:  


6/10/19 1939                                                                    


           6/10/19 1939





Results 24 hrs





Current Medications


 Medications
   Dose
          Sig/Rl
       Start Time
   Status  Last


 (Trade)       Ordered        Route
 PRN     Stop Time              Admin
Dose


                              Reason                                Admin


 Sodium         1,000 ml @ 
   Q30M STAT
     6/10/19       DC           6/10/19


Chloride       2,000 mls/hr   IV
            19:18
                       19:49



                                             6/10/19 19:47


 Morphine       4 mg           ONCE  STAT
    6/10/19       DC           6/10/19


Sulfate
                      IV
            19:18
                       19:49



(morphine)                                   6/10/19 19:22


 Ondansetron    4 mg           ONCE  STAT
    6/10/19       DC           6/10/19


HCl
  (Zofran                 IV
            19:18
                       19:49



Inj)                                         6/10/19 19:22


 Ceftriaxone    50 ml @ 
      ONCE  STAT
    6/10/19       DC           6/10/19


Sodium         100 mls/hr     IVPB
          19:18
                       20:14



                                             6/10/19 19:47


 Piperacillin   100 ml @ 
     ONCE  STAT
    6/10/19       DC           6/10/19


Sod/
          200 mls/hr     IVPB
          19:18
                       19:49



Tazobactam                                   6/10/19 19:47


Sod


 Vancomycin     250 ml @ 
     ONCE  ONCE
    6/10/19       DC           6/10/19


HCl            125 mls/hr     IVPB
          19:30
                       20:52



                                             6/10/19 21:29


 Ibuprofen
     600 mg         ONCE  ONCE
    6/10/19       DC           6/10/19


(Motrin)                      PO
            19:30
                       19:49



                                             6/10/19 19:31








Procedures/MDM


IV line was established patient was placed on cardiac monitor rhythm strip 


revealed a sinus rhythm at about 90 bpm with upright P and T waves.  Patient was


afebrile 





I administered 2 L normal saline IV, ibuprofen 600 mg p.o., morphine 4 mg IV, 


Zofran 4 mg IV, Ceftriaxone 1 g IV, Zosyn 3.375 g IV, vancomycin 1 g IV





CBC reveals a leukocytosis of 21, electrolytes are unremarkable, liver function 


tests normal.  I do not suspect sepsis.





I spoke to Dr. Magdaleno regarding the patient's return as he was the surgeon last 


week.





Patient admitted to [facility] for continued IV antibiotics and I&D





Departure


Diagnosis:  


   Primary Impression:  


   Abscess, gluteal, left


   Additional Impression:  


   Heroin abuse


Condition:  SERGEI Hoffmann MD             Jose 10, 2019 19:27

## 2019-06-11 VITALS — RESPIRATION RATE: 16 BRPM | DIASTOLIC BLOOD PRESSURE: 55 MMHG | SYSTOLIC BLOOD PRESSURE: 95 MMHG | HEART RATE: 86 BPM

## 2019-06-11 VITALS — HEART RATE: 112 BPM | DIASTOLIC BLOOD PRESSURE: 55 MMHG | RESPIRATION RATE: 19 BRPM | SYSTOLIC BLOOD PRESSURE: 100 MMHG

## 2019-06-11 VITALS — RESPIRATION RATE: 18 BRPM | SYSTOLIC BLOOD PRESSURE: 92 MMHG | DIASTOLIC BLOOD PRESSURE: 53 MMHG | HEART RATE: 82 BPM

## 2019-06-11 VITALS — HEART RATE: 89 BPM | SYSTOLIC BLOOD PRESSURE: 98 MMHG | DIASTOLIC BLOOD PRESSURE: 51 MMHG | RESPIRATION RATE: 18 BRPM

## 2019-06-11 LAB
ADD MAN DIFF?: NO
ALANINE AMINOTRANSFERASE: 81 IU/L (ref 13–69)
ALBUMIN/GLOBULIN RATIO: 0.8
ALBUMIN: 2.8 G/DL (ref 3.3–4.9)
ALKALINE PHOSPHATASE: 124 IU/L (ref 42–121)
ANION GAP: 4 (ref 5–13)
ASPARTATE AMINO TRANSFERASE: 40 IU/L (ref 15–46)
BASOPHIL #: 0.1 10^3/UL (ref 0–0.1)
BASOPHILS %: 0.4 % (ref 0–2)
BILIRUBIN,DIRECT: 0 MG/DL (ref 0–0.2)
BILIRUBIN,TOTAL: 0.4 MG/DL (ref 0.2–1.3)
BLOOD UREA NITROGEN: 13 MG/DL (ref 7–20)
C-REACTIVE PROTEIN: 14.2 MG/DL (ref 0–0.9)
CALCIUM: 8.4 MG/DL (ref 8.4–10.2)
CARBON DIOXIDE: 33 MMOL/L (ref 21–31)
CHLORIDE: 102 MMOL/L (ref 97–110)
CREATININE: 0.68 MG/DL (ref 0.61–1.24)
EOSINOPHILS #: 0.2 10^3/UL (ref 0–0.5)
EOSINOPHILS %: 1 % (ref 0–7)
ERYTHROCYTE SEDIMENTATION RATE: 61 MM/HR (ref 0–15)
GLOBULIN: 3.5 G/DL (ref 1.3–3.2)
GLUCOSE: 102 MG/DL (ref 70–220)
HEMATOCRIT: 29.3 % (ref 42–52)
HEMOGLOBIN: 9.4 G/DL (ref 14–18)
IMMATURE GRANS #M: 0.1 10^3/UL (ref 0–0.03)
IMMATURE GRANS % (M): 0.6 % (ref 0–0.43)
LYMPHOCYTES #: 1.6 10^3/UL (ref 0.8–2.9)
LYMPHOCYTES %: 9.7 % (ref 15–51)
MAGNESIUM: 2.1 MG/DL (ref 1.7–2.5)
MEAN CORPUSCULAR HEMOGLOBIN: 30.4 PG (ref 29–33)
MEAN CORPUSCULAR HGB CONC: 32.1 G/DL (ref 32–37)
MEAN CORPUSCULAR VOLUME: 94.8 FL (ref 82–101)
MEAN PLATELET VOLUME: 8 FL (ref 7.4–10.4)
MONOCYTE #: 1.4 10^3/UL (ref 0.3–0.9)
MONOCYTES %: 8.1 % (ref 0–11)
NEUTROPHIL #: 13.6 10^3/UL (ref 1.6–7.5)
NEUTROPHILS %: 80.2 % (ref 39–77)
NUCLEATED RED BLOOD CELLS #: 0 10^3/UL (ref 0–0)
NUCLEATED RED BLOOD CELLS%: 0 /100WBC (ref 0–0)
PLATELET COUNT: 360 10^3/UL (ref 140–415)
POTASSIUM: 5.3 MMOL/L (ref 3.5–5.1)
RED BLOOD COUNT: 3.09 10^6/UL (ref 4.7–6.1)
RED CELL DISTRIBUTION WIDTH: 13.2 % (ref 11.5–14.5)
SODIUM: 139 MMOL/L (ref 135–144)
TOTAL PROTEIN: 6.3 G/DL (ref 6.1–8.1)
WHITE BLOOD COUNT: 17 10^3/UL (ref 4.8–10.8)

## 2019-06-11 PROCEDURE — 0H98XZZ DRAINAGE OF BUTTOCK SKIN, EXTERNAL APPROACH: ICD-10-PCS

## 2019-06-11 RX ADMIN — THIAMINE HYDROCHLORIDE 1 MLS/HR: 100 INJECTION, SOLUTION INTRAMUSCULAR; INTRAVENOUS at 06:00

## 2019-06-11 RX ADMIN — PIPERACILLIN SODIUM AND TAZOBACTAM SODIUM SCH MLS/HR: 3; .375 INJECTION, POWDER, LYOPHILIZED, FOR SOLUTION INTRAVENOUS at 12:30

## 2019-06-11 RX ADMIN — LIDOCAINE HYDROCHLORIDE,EPINEPHRINE BITARTRATE 1 ML: 20; .01 INJECTION, SOLUTION INFILTRATION; PERINEURAL at 14:30

## 2019-06-11 RX ADMIN — MORPHINE SULFATE 1 MG: 2 INJECTION, SOLUTION INTRAMUSCULAR; INTRAVENOUS at 14:43

## 2019-06-11 RX ADMIN — THIAMINE HYDROCHLORIDE 1 MLS/HR: 100 INJECTION, SOLUTION INTRAMUSCULAR; INTRAVENOUS at 01:17

## 2019-06-11 RX ADMIN — PIPERACILLIN SODIUM AND TAZOBACTAM SODIUM SCH MLS/HR: 3; .375 INJECTION, POWDER, LYOPHILIZED, FOR SOLUTION INTRAVENOUS at 18:23

## 2019-06-11 RX ADMIN — MORPHINE SULFATE PRN MG: 2 INJECTION, SOLUTION INTRAMUSCULAR; INTRAVENOUS at 10:26

## 2019-06-11 RX ADMIN — FOLIC ACID SCH MLS/HR: 5 INJECTION, SOLUTION INTRAMUSCULAR; INTRAVENOUS; SUBCUTANEOUS at 06:00

## 2019-06-11 RX ADMIN — MORPHINE SULFATE 1 MG: 2 INJECTION, SOLUTION INTRAMUSCULAR; INTRAVENOUS at 10:26

## 2019-06-11 RX ADMIN — PIPERACILLIN SODIUM AND TAZOBACTAM SODIUM 1 MLS/HR: 3; .375 INJECTION, POWDER, LYOPHILIZED, FOR SOLUTION INTRAVENOUS at 01:18

## 2019-06-11 RX ADMIN — MORPHINE SULFATE PRN MG: 2 INJECTION, SOLUTION INTRAMUSCULAR; INTRAVENOUS at 14:43

## 2019-06-11 RX ADMIN — MORPHINE SULFATE PRN MG: 2 INJECTION, SOLUTION INTRAMUSCULAR; INTRAVENOUS at 22:59

## 2019-06-11 RX ADMIN — VANCOMYCIN HYDROCHLORIDE 1 MLS/HR: 1 INJECTION, POWDER, LYOPHILIZED, FOR SOLUTION INTRAVENOUS at 06:10

## 2019-06-11 RX ADMIN — FOLIC ACID SCH MLS/HR: 5 INJECTION, SOLUTION INTRAMUSCULAR; INTRAVENOUS; SUBCUTANEOUS at 20:36

## 2019-06-11 RX ADMIN — MORPHINE SULFATE PRN MG: 2 INJECTION, SOLUTION INTRAMUSCULAR; INTRAVENOUS at 06:09

## 2019-06-11 RX ADMIN — MORPHINE SULFATE 1 MG: 2 INJECTION, SOLUTION INTRAMUSCULAR; INTRAVENOUS at 22:59

## 2019-06-11 RX ADMIN — PIPERACILLIN SODIUM AND TAZOBACTAM SODIUM 1 MLS/HR: 3; .375 INJECTION, POWDER, LYOPHILIZED, FOR SOLUTION INTRAVENOUS at 22:58

## 2019-06-11 RX ADMIN — VANCOMYCIN HYDROCHLORIDE SCH MLS/HR: 1 INJECTION, POWDER, LYOPHILIZED, FOR SOLUTION INTRAVENOUS at 20:36

## 2019-06-11 RX ADMIN — MORPHINE SULFATE 1 MG: 2 INJECTION, SOLUTION INTRAMUSCULAR; INTRAVENOUS at 06:09

## 2019-06-11 RX ADMIN — PIPERACILLIN SODIUM AND TAZOBACTAM SODIUM 1 MLS/HR: 3; .375 INJECTION, POWDER, LYOPHILIZED, FOR SOLUTION INTRAVENOUS at 18:23

## 2019-06-11 RX ADMIN — THIAMINE HYDROCHLORIDE 1 MLS/HR: 100 INJECTION, SOLUTION INTRAMUSCULAR; INTRAVENOUS at 20:36

## 2019-06-11 RX ADMIN — PIPERACILLIN SODIUM AND TAZOBACTAM SODIUM 1 MLS/HR: 3; .375 INJECTION, POWDER, LYOPHILIZED, FOR SOLUTION INTRAVENOUS at 12:30

## 2019-06-11 RX ADMIN — VANCOMYCIN HYDROCHLORIDE 1 MLS/HR: 1 INJECTION, POWDER, LYOPHILIZED, FOR SOLUTION INTRAVENOUS at 13:14

## 2019-06-11 RX ADMIN — PIPERACILLIN SODIUM AND TAZOBACTAM SODIUM SCH MLS/HR: 3; .375 INJECTION, POWDER, LYOPHILIZED, FOR SOLUTION INTRAVENOUS at 05:39

## 2019-06-11 RX ADMIN — PIPERACILLIN SODIUM AND TAZOBACTAM SODIUM SCH MLS/HR: 3; .375 INJECTION, POWDER, LYOPHILIZED, FOR SOLUTION INTRAVENOUS at 22:58

## 2019-06-11 RX ADMIN — VANCOMYCIN HYDROCHLORIDE 1 MLS/HR: 1 INJECTION, POWDER, LYOPHILIZED, FOR SOLUTION INTRAVENOUS at 20:36

## 2019-06-11 RX ADMIN — VANCOMYCIN HYDROCHLORIDE SCH MLS/HR: 1 INJECTION, POWDER, LYOPHILIZED, FOR SOLUTION INTRAVENOUS at 13:14

## 2019-06-11 RX ADMIN — PIPERACILLIN SODIUM AND TAZOBACTAM SODIUM SCH MLS/HR: 3; .375 INJECTION, POWDER, LYOPHILIZED, FOR SOLUTION INTRAVENOUS at 01:18

## 2019-06-11 RX ADMIN — PIPERACILLIN SODIUM AND TAZOBACTAM SODIUM 1 MLS/HR: 3; .375 INJECTION, POWDER, LYOPHILIZED, FOR SOLUTION INTRAVENOUS at 05:39

## 2019-06-11 RX ADMIN — VANCOMYCIN HYDROCHLORIDE SCH MLS/HR: 1 INJECTION, POWDER, LYOPHILIZED, FOR SOLUTION INTRAVENOUS at 06:10

## 2019-06-11 RX ADMIN — HYDROMORPHONE HYDROCHLORIDE 1 MG: 1 INJECTION, SOLUTION INTRAMUSCULAR; INTRAVENOUS; SUBCUTANEOUS at 18:22

## 2019-06-11 NOTE — PN
Date/Time of Note


Date/Time of Note


DATE: 6/11/19 


TIME: 13:57





Assessment/Plan


VTE Prophylaxis


[place] [facility] risk:  1


SCD applied (from Ns):  Yes


Pharmacological prophylaxis:  NA/contraindicated


Pharm contraindication:  low risk/ambulating





Lines/Catheters


IV Catheter Type (from [facility]):  Peripheral IV


Urinary Cath still in place:  No





Assessment/Plan


Hospital Course


1.  Left gluteal abscess: Status post I&D 6/3/2019


   -will need repeat I&D per surgery, they will schedule


   -IV antibiotics


   -Pain management


2.  Leukocytosis:


   -As above


   -Trend


3.Hyperkalemia:


-Optimize electrolytes


4.  Elevated ALT and alkaline phosphatase: Improving


-Trend


-Further work-up if persistent


5.  Normocytic normochromic anemia:


-Monitor and transfuse as needed


6.  Asthma history:


-Med management


7.  Illicit drug use:


-Highly encourage cessation





Further interventions per clinical course 


.


Result Diagram:  


6/11/19 0552                                                                    


           6/11/19 0552





Results 24hrs





Laboratory Tests


       Test
                                6/10/19
19:39  6/11/19
05:52


       White Blood Count                         20.8  #H        17.0  H


       Red Blood Count                            3.54  L        3.09  L


       Hemoglobin                                 10.7  L         9.4  L


       Hematocrit                                 32.9  L        29.3  L


       Mean Corpuscular Volume                     92.9           94.8


       Mean Corpuscular Hemoglobin                 30.2           30.4


       Mean Corpuscular Hemoglobin
Concent        32.5  
        32.1  



       Red Cell Distribution Width                 13.0           13.2


       Platelet Count                              446  H          360


       Mean Platelet Volume                         7.9            8.0


       Immature Granulocytes %                   1.300  H       0.600  H


       Neutrophils %                              78.3  H        80.2  H


       Lymphocytes %                              10.4  L         9.7  L


       Monocytes %                                  8.8            8.1


       Eosinophils %                                0.8            1.0


       Basophils %                                  0.4            0.4


       Nucleated Red Blood Cells %                  0.0            0.0


       Immature Granulocytes #                   0.260  H       0.100  H


       Neutrophils #                              16.3  H        13.6  H


       Lymphocytes #                                2.2            1.6


       Monocytes #                                 1.8  H         1.4  H


       Eosinophils #                                0.2            0.2


       Basophils #                                  0.1            0.1


       Nucleated Red Blood Cells #                  0.0            0.0


       Erythrocyte Sedimentation Rate               70  H          61  H


       Prothrombin Time                            14.0


       Prothrombin Time Ratio                       1.1


       INR International Normalized
Ratio         1.07  
  



       Activated Partial
Thromboplast Time        34.6  
  



       Sodium Level                                 136            139


       Potassium Level                              3.9           5.3  H


       Chloride Level                               96  L          102


       Carbon Dioxide Level                         32  H          33  H


       Anion Gap                                      8             4  L


       Blood Urea Nitrogen                           18             13


       Creatinine                                  0.86           0.68


       Est Glomerular Filtrat Rate
mL/min   > 60  
        > 60  



       Glucose Level                                 75            102


       Calcium Level                                8.6            8.4


       Total Bilirubin                              0.3            0.4


       Direct Bilirubin                            0.00           0.00


       Indirect Bilirubin                           0.3            0.4


       Aspartate Amino Transf
(AST/SGOT)           59  H
          40  



       Alanine Aminotransferase
(ALT/SGPT)         97  H
         81  H



       Alkaline Phosphatase                        137  H         124  H


       Total Protein                                7.5           6.3  #


       Albumin                                      3.5           2.8  L


       Globulin                                   4.00  H        3.50  H


       Albumin/Globulin Ratio                      0.87           0.80


       Lipase                                        46


       Magnesium Level                                             2.1


       C-Reactive Protein                                        14.2  H








Subjective


24 Hr Interval Summary


Free Text/Dictation


no new issues





Exam/Review of Systems


Exam


Vitals





Vital Signs


  Date      Temp  Pulse  Resp  B/P (MAP)   Pulse Ox  O2          O2 Flow    FiO2


Time                                                 Delivery    Rate


   6/11/19  98.4     89    18  98/51 (67)        97


     07:59


   6/10/19                                           Room Air


     20:53








Intake and Output





6/10/19


6/10/19


6/11/19





1515:00


23:00


07:00





IntakeIntake Total


1150 ml


480 ml





BalanceBalance


1150 ml


480 ml











Exam


General: no acute distress


HEENT: Atraumatic, normocephalic. The pupils are equal, round and reactive. 


Extraocular motor are intact


Neck: Supple with full range of motion. No rigidity or meningismus


Chest: Nontender


Lungs: Clear to auscultation bilaterally no crackles rales or wheezing


Heart: Normal S1-S2, Regular rhythm and rate. No murmur, S3, or S4


Abdomen: Soft , nontender,  nondistended , bowel sounds are present. No guarding


no rebound tenderness , No masses or organomegaly. No costovertebral temporal 


angle mass


Extremities: Normal to inspection, no edema no cyanosis


Skin: Significant left gluteal swelling and induration noted, approximately 7 x 


7 cm.





Results


Results 24hrs





Laboratory Tests


       Test
                                6/10/19
19:39  6/11/19
05:52


       White Blood Count                         20.8  #H        17.0  H


       Red Blood Count                            3.54  L        3.09  L


       Hemoglobin                                 10.7  L         9.4  L


       Hematocrit                                 32.9  L        29.3  L


       Mean Corpuscular Volume                     92.9           94.8


       Mean Corpuscular Hemoglobin                 30.2           30.4


       Mean Corpuscular Hemoglobin
Concent        32.5  
        32.1  



       Red Cell Distribution Width                 13.0           13.2


       Platelet Count                              446  H          360


       Mean Platelet Volume                         7.9            8.0


       Immature Granulocytes %                   1.300  H       0.600  H


       Neutrophils %                              78.3  H        80.2  H


       Lymphocytes %                              10.4  L         9.7  L


       Monocytes %                                  8.8            8.1


       Eosinophils %                                0.8            1.0


       Basophils %                                  0.4            0.4


       Nucleated Red Blood Cells %                  0.0            0.0


       Immature Granulocytes #                   0.260  H       0.100  H


       Neutrophils #                              16.3  H        13.6  H


       Lymphocytes #                                2.2            1.6


       Monocytes #                                 1.8  H         1.4  H


       Eosinophils #                                0.2            0.2


       Basophils #                                  0.1            0.1


       Nucleated Red Blood Cells #                  0.0            0.0


       Erythrocyte Sedimentation Rate               70  H          61  H


       Prothrombin Time                            14.0


       Prothrombin Time Ratio                       1.1


       INR International Normalized
Ratio         1.07  
  



       Activated Partial
Thromboplast Time        34.6  
  



       Sodium Level                                 136            139


       Potassium Level                              3.9           5.3  H


       Chloride Level                               96  L          102


       Carbon Dioxide Level                         32  H          33  H


       Anion Gap                                      8             4  L


       Blood Urea Nitrogen                           18             13


       Creatinine                                  0.86           0.68


       Est Glomerular Filtrat Rate
mL/min   > 60  
        > 60  



       Glucose Level                                 75            102


       Calcium Level                                8.6            8.4


       Total Bilirubin                              0.3            0.4


       Direct Bilirubin                            0.00           0.00


       Indirect Bilirubin                           0.3            0.4


       Aspartate Amino Transf
(AST/SGOT)           59  H
          40  



       Alanine Aminotransferase
(ALT/SGPT)         97  H
         81  H



       Alkaline Phosphatase                        137  H         124  H


       Total Protein                                7.5           6.3  #


       Albumin                                      3.5           2.8  L


       Globulin                                   4.00  H        3.50  H


       Albumin/Globulin Ratio                      0.87           0.80


       Lipase                                        46


       Magnesium Level                                             2.1


       C-Reactive Protein                                        14.2  H








Medications


Medication





Current Medications


IV Flush (NS 3 ml) 3 ml PER PROTOCOL IV ;  Start 6/10/19 at 20:30


Ondansetron HCl (Zofran Inj) 4 mg Q6H  PRN IV NAUSEA/VOMITING;  Start 6/10/19 at


20:30


Acetaminophen (Tylenol Tab) 650 mg Q6H  PRN PO .PAIN 1-3 OR TEMP;  Start 6/10/19


at 20:30


Acetaminophen/ Hydrocodone Bitart (Norco (5/325)) 1 tab Q6H  PRN PO .MOD PAIN 4-


6;  Start 6/10/19 at 20:30


Morphine Sulfate (morphine) 2 mg Q4H  PRN IV .SEVERE PAIN 7-10 Last administered


on 6/11/19at 10:26; Admin Dose 2 MG;  Start 6/10/19 at 20:30


Docusate Sodium (Colace) 100 mg Q12H  PRN PO .CONSTIPATION;  Start 6/10/19 at 


20:30


Bisacodyl (Dulcolax) 5 mg DAILY  PRN PO .CONSTIPATION;  Start 6/10/19 at 20:30


Vancomycin HCl (Vanco Iv Per Pharmacy) VANCOMYCIN PER PHARMACY PER PROTOCOL XX ;


 Start 6/10/19 at 20:30


Piperacillin Sod/ Tazobactam Sod 100 ml @  200 mls/hr Q6 IVPB  Last administered


on 6/11/19at 12:30; Admin Dose 200 MLS/HR;  Start 6/11/19 at 00:00


Vancomycin HCl 250 ml @  125 mls/hr Q8H IVPB  Last administered on 6/11/19at 


13:14; Admin Dose 125 MLS/HR;  Start 6/11/19 at 05:00


Sodium Chloride 1,000 ml @  70 mls/hr [id number] IV ;  Start 6/11/19 at 06:00


Miscellaneous Information (*Rx Drug Level Order Reminder*) VANCO TROUGH 6/ 12 @ 


0,400 0400  ONCE XX ;  Start 6/12/19 at 04:00;  Stop 6/12/19 at 04:01











MARLIN CANAS                Jun 11, 2019 13:58

## 2019-06-11 NOTE — OPR
Date/Time of Note


Date/Time of Note


DATE: 6/11/19 


TIME: 17:52





Operative Report


Procedure Date:  Jun 11, 2019


Preoperative Diagnosis


Large left gluteal abscess


Postoperative Diagnosis


Same, multiloculated


Operation/Procedure Performed


1.  Incision and drainage of large left gluteal abscess 8 x 8 cm


2.  Local anesthetic injection, 32288


Surgeon


Sunil Lepe MD


Assistant


Dior Gilliam, PEDRO


Anesthesia Type:  other (Local)


Estimated Blood Loss:  0 - 10 ml's


Transfusion


   none


Specimen


Culture


Grafts/Implants


Kerlix with Betadine


Tubes/Drains


None


Complications


none


Pt Condition Post Procedure:  stable


Disposition:  other (His own room)


Indications


Per consult note.





Risks include but are not limited to bleeding, infection, abscess, seroma, leak,


chronic wound, chronic pain, need for re-operations or further surgeries, MI, 


stroke, PE, DVT, pneumonia, organ failures, or even death.


Procedure Description


Patient was placed in lateral decubitus with left side up.  All pressure points 


were well-padded.  Timeout was performed.  He is prepped and draped sterilely.





Local anesthetic injection is performed at surgical site.





Needle aspiration is performed and abscesses identified.  Vertical incision is 


made into the gluteal area on the left side and a large amount of pus and debris


is drained.  Finger dissection is to break up all the multi-loculations.  Wound 


is fully irrigated with Betadine and saline and then packed with Kerlix and 


Betadine.  Dressing is applied.  Patient tolerated procedure well.











SUNIL LEPE MD              Jun 11, 2019 17:55

## 2019-06-11 NOTE — CONS
Assessment/Plan


Assessment/Plan


Hospital Course (Demo Recall)


1.  Left gluteal abscess: Status post I&D 6/3/2019


-will need repeat I&D> will schedule


-IV antibiotics


-Pain management


2.  Leukocytosis:


-As above


-Trend


3.Hyperkalemia:


-Optimize electrolytes


4.  Elevated ALT and alkaline phosphatase: Improving


-Trend


-Further work-up if persistent


5.  Normocytic normochromic anemia:


-Monitor and transfuse as needed


6.  Asthma history:


-Med management


7.  Illicit drug use:


-Highly encourage cessation





Thank you.  Patient seen and examined in collaboration with Dr. Chriss Carbajal.





Consultation Date/Type/Reason


Admit Date/Time





Date of Consultation:  Jun 11, 2019


Type of Consult


Surgical


Reason for Consultation


Left gluteal abscess


Date/Time of Note


DATE: 6/11/19 


TIME: 11:06





Hx of Present Illness


Jim Pugh is a 38-year-old man with past medical history of illicit drug 


use, who presents with left gluteal pain and swelling.  Notably, he was admitted


a few days ago for similar issues and underwent left gluteal abscess incision 


and drainage.  He again returns with increased swelling to the left gluteal 


area.  He denies injecting into the area however he does admit to injecting 


heroin intravenously in his arm. Laboratory findings significant for 


leukocytosis.  Imaging of the left gluteal area shows heterogeneous collection 


in subcutaneous tissues measuring 7.8 x 3.7 x 8.4 cm.  Additional symptoms 


include chills and left buttock pain.  He denies fevers, chills, congested 


cough, additional trauma to the area, change in bowel or bladder habits, skin 


changes, seizure, rash.  General surgery was asked to evaluate.


12 point review of systems was performed and is negative except as stated in 


HPI.





Past Medical History


asthma


illicit drug use


abscess s/p I&d


Home Meds


Active Scripts


Amoxicillin* (Amoxicillin*) 500 Mg Cap, 500 MG PO BID for 7 Days, CAP


   Prov:LORNA,ARNAV C         2/28/18


Ibuprofen* (Motrin*) 600 Mg Tab, 600 MG PO Q6, #30 TAB


   Prov:LORNAARNAV C         2/28/18


Medications





Current Medications


IV Flush (NS 3 ml) 3 ml PER PROTOCOL IV ;  Start 6/10/19 at 20:30


Ondansetron HCl (Zofran Inj) 4 mg Q6H  PRN IV NAUSEA/VOMITING;  Start 6/10/19 at


20:30


Acetaminophen (Tylenol Tab) 650 mg Q6H  PRN PO .PAIN 1-3 OR TEMP;  Start 6/10/19


at 20:30


Acetaminophen/ Hydrocodone Bitart (Norco (5/325)) 1 tab Q6H  PRN PO .MOD PAIN 4-


6;  Start 6/10/19 at 20:30


Morphine Sulfate (morphine) 2 mg Q4H  PRN IV .SEVERE PAIN 7-10 Last administered


on 6/11/19at 10:26; Admin Dose 2 MG;  Start 6/10/19 at 20:30


Docusate Sodium (Colace) 100 mg Q12H  PRN PO .CONSTIPATION;  Start 6/10/19 at 


20:30


Bisacodyl (Dulcolax) 5 mg DAILY  PRN PO .CONSTIPATION;  Start 6/10/19 at 20:30


Vancomycin HCl (Vanco Iv Per Pharmacy) VANCOMYCIN PER PHARMACY PER PROTOCOL XX ;


 Start 6/10/19 at 20:30


Piperacillin Sod/ Tazobactam Sod 100 ml @  200 mls/hr Q6 IVPB  Last administered


on 6/11/19at 05:39; Admin Dose 200 MLS/HR;  Start 6/11/19 at 00:00


Vancomycin HCl 250 ml @  125 mls/hr Q8H IVPB  Last administered on 6/11/19at 


06:10; Admin Dose 125 MLS/HR;  Start 6/11/19 at 05:00


Sodium Chloride 1,000 ml @  70 mls/hr [id number] IV ;  Start 6/11/19 at 06:00


Allergies:  


Coded Allergies:  


     No Known Drug Allergies (Verified  Allergy, Unknown, 2/28/18)





Past Surgical History


as above


Past Surgical Hx:  other





Family History


Significant Family History:  no pertinent family hx





Social History


Alcohol Use:  other (Unknown)


Smoking Status:  Current every day smoker


Drug Use:  heroin





Exam/Review of Systems


Exam


Vitals





Vital Signs


  Date      Temp  Pulse  Resp  B/P (MAP)   Pulse Ox  O2          O2 Flow    FiO2


Time                                                 Delivery    Rate


   6/11/19  98.4     89    18  98/51 (67)        97


     07:59


   6/10/19                                           Room Air


     20:53








Intake and Output





6/10/19


6/10/19


6/11/19





1515:00


23:00


07:00





IntakeIntake Total


1150 ml


480 ml





BalanceBalance


1150 ml


480 ml











Constitutional:  alert, oriented


Psych:  nl mood/affect, anxiety


Head:  normocephalic, atraumatic


Eyes:  nl conjunctiva, EOMI, nl lids, nl sclera


ENMT:  nl external ears & nose, nl lips & teeth, nl nasal mucosa & septum, 


mucosa pink and moist


Neck:  supple, non-tender


Respiratory:  normal air movement; 


   No congested cough


Cardiovascular:  regular rate and rhythm, nl pulses


Gastrointestinal:  soft, non-tender


Musculoskeletal:  nl extremities to inspection, nl gait and stance


Extremities:  normal pulses


Neurological:  nl mental status, nl speech, nl strength


Skin:  nl turgor, other (Left buttock: Tender, fluctuant, erythema | left hip: 


Open wounds: Min drainage)





Results


Result Diagram:  


6/11/19 0552                                                                    


           6/11/19 0552





Results 24hrs





Laboratory Tests


       Test
                                6/10/19
19:39  6/11/19
05:52


       White Blood Count                         20.8  #H        17.0  H


       Red Blood Count                            3.54  L        3.09  L


       Hemoglobin                                 10.7  L         9.4  L


       Hematocrit                                 32.9  L        29.3  L


       Mean Corpuscular Volume                     92.9           94.8


       Mean Corpuscular Hemoglobin                 30.2           30.4


       Mean Corpuscular Hemoglobin
Concent        32.5  
        32.1  



       Red Cell Distribution Width                 13.0           13.2


       Platelet Count                              446  H          360


       Mean Platelet Volume                         7.9            8.0


       Immature Granulocytes %                   1.300  H       0.600  H


       Neutrophils %                              78.3  H        80.2  H


       Lymphocytes %                              10.4  L         9.7  L


       Monocytes %                                  8.8            8.1


       Eosinophils %                                0.8            1.0


       Basophils %                                  0.4            0.4


       Nucleated Red Blood Cells %                  0.0            0.0


       Immature Granulocytes #                   0.260  H       0.100  H


       Neutrophils #                              16.3  H        13.6  H


       Lymphocytes #                                2.2            1.6


       Monocytes #                                 1.8  H         1.4  H


       Eosinophils #                                0.2            0.2


       Basophils #                                  0.1            0.1


       Nucleated Red Blood Cells #                  0.0            0.0


       Erythrocyte Sedimentation Rate               70  H          61  H


       Prothrombin Time                            14.0


       Prothrombin Time Ratio                       1.1


       INR International Normalized
Ratio         1.07  
  



       Activated Partial
Thromboplast Time        34.6  
  



       Sodium Level                                 136            139


       Potassium Level                              3.9           5.3  H


       Chloride Level                               96  L          102


       Carbon Dioxide Level                         32  H          33  H


       Anion Gap                                      8             4  L


       Blood Urea Nitrogen                           18             13


       Creatinine                                  0.86           0.68


       Est Glomerular Filtrat Rate
mL/min   > 60  
        > 60  



       Glucose Level                                 75            102


       Calcium Level                                8.6            8.4


       Total Bilirubin                              0.3            0.4


       Direct Bilirubin                            0.00           0.00


       Indirect Bilirubin                           0.3            0.4


       Aspartate Amino Transf
(AST/SGOT)           59  H
          40  



       Alanine Aminotransferase
(ALT/SGPT)         97  H
         81  H



       Alkaline Phosphatase                        137  H         124  H


       Total Protein                                7.5           6.3  #


       Albumin                                      3.5           2.8  L


       Globulin                                   4.00  H        3.50  H


       Albumin/Globulin Ratio                      0.87           0.80


       Lipase                                        46


       Magnesium Level                                             2.1


       C-Reactive Protein                                        14.2  H








Medications


Medication





Current Medications


IV Flush (NS 3 ml) 3 ml PER PROTOCOL IV ;  Start 6/10/19 at 20:30


Ondansetron HCl (Zofran Inj) 4 mg Q6H  PRN IV NAUSEA/VOMITING;  Start 6/10/19 at


20:30


Acetaminophen (Tylenol Tab) 650 mg Q6H  PRN PO .PAIN 1-3 OR TEMP;  Start 6/10/19


at 20:30


Acetaminophen/ Hydrocodone Bitart (Norco (5/325)) 1 tab Q6H  PRN PO .MOD PAIN 4-


6;  Start 6/10/19 at 20:30


Morphine Sulfate (morphine) 2 mg Q4H  PRN IV .SEVERE PAIN 7-10 Last administered


on 6/11/19at 10:26; Admin Dose 2 MG;  Start 6/10/19 at 20:30


Docusate Sodium (Colace) 100 mg Q12H  PRN PO .CONSTIPATION;  Start 6/10/19 at 


20:30


Bisacodyl (Dulcolax) 5 mg DAILY  PRN PO .CONSTIPATION;  Start 6/10/19 at 20:30


Vancomycin HCl (Vanco Iv Per Pharmacy) VANCOMYCIN PER PHARMACY PER PROTOCOL XX ;


 Start 6/10/19 at 20:30


Piperacillin Sod/ Tazobactam Sod 100 ml @  200 mls/hr Q6 IVPB  Last administered


on 6/11/19at 05:39; Admin Dose 200 MLS/HR;  Start 6/11/19 at 00:00


Vancomycin HCl 250 ml @  125 mls/hr Q8H IVPB  Last administered on 6/11/19at 


06:10; Admin Dose 125 MLS/HR;  Start 6/11/19 at 05:00


Sodium Chloride 1,000 ml @  70 mls/hr [id number] IV ;  Start 6/11/19 at 06:00











CHOLO KIRKPATRICK NP       Jun 11, 2019 11:21

## 2019-06-12 VITALS — HEART RATE: 90 BPM | SYSTOLIC BLOOD PRESSURE: 87 MMHG | RESPIRATION RATE: 18 BRPM | DIASTOLIC BLOOD PRESSURE: 53 MMHG

## 2019-06-12 VITALS — RESPIRATION RATE: 19 BRPM | HEART RATE: 95 BPM | SYSTOLIC BLOOD PRESSURE: 105 MMHG | DIASTOLIC BLOOD PRESSURE: 63 MMHG

## 2019-06-12 VITALS — DIASTOLIC BLOOD PRESSURE: 65 MMHG | SYSTOLIC BLOOD PRESSURE: 105 MMHG | RESPIRATION RATE: 18 BRPM | HEART RATE: 90 BPM

## 2019-06-12 VITALS — DIASTOLIC BLOOD PRESSURE: 57 MMHG | RESPIRATION RATE: 16 BRPM | SYSTOLIC BLOOD PRESSURE: 110 MMHG | HEART RATE: 96 BPM

## 2019-06-12 LAB
ADD MAN DIFF?: NO
BASOPHIL #: 0 10^3/UL (ref 0–0.1)
BASOPHILS %: 0.5 % (ref 0–2)
EOSINOPHILS #: 0.2 10^3/UL (ref 0–0.5)
EOSINOPHILS %: 2.2 % (ref 0–7)
HEMATOCRIT: 29.6 % (ref 42–52)
HEMOGLOBIN: 9.6 G/DL (ref 14–18)
IMMATURE GRANS #M: 0.06 10^3/UL (ref 0–0.03)
IMMATURE GRANS % (M): 0.7 % (ref 0–0.43)
LYMPHOCYTES #: 2 10^3/UL (ref 0.8–2.9)
LYMPHOCYTES %: 24.1 % (ref 15–51)
MEAN CORPUSCULAR HEMOGLOBIN: 30.1 PG (ref 29–33)
MEAN CORPUSCULAR HGB CONC: 32.4 G/DL (ref 32–37)
MEAN CORPUSCULAR VOLUME: 92.8 FL (ref 82–101)
MEAN PLATELET VOLUME: 8.3 FL (ref 7.4–10.4)
MONOCYTE #: 0.6 10^3/UL (ref 0.3–0.9)
MONOCYTES %: 7.9 % (ref 0–11)
NEUTROPHIL #: 5.2 10^3/UL (ref 1.6–7.5)
NEUTROPHILS %: 64.6 % (ref 39–77)
NUCLEATED RED BLOOD CELLS #: 0 10^3/UL (ref 0–0)
NUCLEATED RED BLOOD CELLS%: 0 /100WBC (ref 0–0)
PLATELET COUNT: 406 10^3/UL (ref 140–415)
RED BLOOD COUNT: 3.19 10^6/UL (ref 4.7–6.1)
RED CELL DISTRIBUTION WIDTH: 12.7 % (ref 11.5–14.5)
VANCOMYCIN,TROUGH: 10.6 UG/ML (ref 10–20)
WHITE BLOOD COUNT: 8.1 10^3/UL (ref 4.8–10.8)

## 2019-06-12 RX ADMIN — VANCOMYCIN HYDROCHLORIDE 1 MLS/HR: 1 INJECTION, POWDER, LYOPHILIZED, FOR SOLUTION INTRAVENOUS at 14:37

## 2019-06-12 RX ADMIN — PIPERACILLIN SODIUM AND TAZOBACTAM SODIUM SCH MLS/HR: 3; .375 INJECTION, POWDER, LYOPHILIZED, FOR SOLUTION INTRAVENOUS at 11:41

## 2019-06-12 RX ADMIN — VANCOMYCIN HYDROCHLORIDE 1 MLS/HR: 1 INJECTION, POWDER, LYOPHILIZED, FOR SOLUTION INTRAVENOUS at 07:22

## 2019-06-12 RX ADMIN — PIPERACILLIN SODIUM AND TAZOBACTAM SODIUM 1 MLS/HR: 3; .375 INJECTION, POWDER, LYOPHILIZED, FOR SOLUTION INTRAVENOUS at 18:53

## 2019-06-12 RX ADMIN — SODIUM HYPOCHLORITE 1 APPLIC: 0.12 SOLUTION TOPICAL at 15:56

## 2019-06-12 RX ADMIN — PIPERACILLIN SODIUM AND TAZOBACTAM SODIUM SCH MLS/HR: 3; .375 INJECTION, POWDER, LYOPHILIZED, FOR SOLUTION INTRAVENOUS at 18:53

## 2019-06-12 RX ADMIN — VANCOMYCIN HYDROCHLORIDE SCH MLS/HR: 1 INJECTION, POWDER, LYOPHILIZED, FOR SOLUTION INTRAVENOUS at 07:22

## 2019-06-12 RX ADMIN — FOLIC ACID SCH MLS/HR: 5 INJECTION, SOLUTION INTRAMUSCULAR; INTRAVENOUS; SUBCUTANEOUS at 10:36

## 2019-06-12 RX ADMIN — VANCOMYCIN HYDROCHLORIDE SCH MLS/HR: 1 INJECTION, POWDER, LYOPHILIZED, FOR SOLUTION INTRAVENOUS at 22:04

## 2019-06-12 RX ADMIN — VANCOMYCIN HYDROCHLORIDE SCH MLS/HR: 1 INJECTION, POWDER, LYOPHILIZED, FOR SOLUTION INTRAVENOUS at 14:37

## 2019-06-12 RX ADMIN — HYDROCODONE BITARTRATE AND ACETAMINOPHEN PRN TAB: 5; 325 TABLET ORAL at 15:56

## 2019-06-12 RX ADMIN — HYDROCODONE BITARTRATE AND ACETAMINOPHEN 1 TAB: 5; 325 TABLET ORAL at 15:56

## 2019-06-12 RX ADMIN — THIAMINE HYDROCHLORIDE 1 MLS/HR: 100 INJECTION, SOLUTION INTRAMUSCULAR; INTRAVENOUS at 10:36

## 2019-06-12 RX ADMIN — PIPERACILLIN SODIUM AND TAZOBACTAM SODIUM 1 MLS/HR: 3; .375 INJECTION, POWDER, LYOPHILIZED, FOR SOLUTION INTRAVENOUS at 05:31

## 2019-06-12 RX ADMIN — PIPERACILLIN SODIUM AND TAZOBACTAM SODIUM 1 MLS/HR: 3; .375 INJECTION, POWDER, LYOPHILIZED, FOR SOLUTION INTRAVENOUS at 11:41

## 2019-06-12 RX ADMIN — PIPERACILLIN SODIUM AND TAZOBACTAM SODIUM SCH MLS/HR: 3; .375 INJECTION, POWDER, LYOPHILIZED, FOR SOLUTION INTRAVENOUS at 05:31

## 2019-06-12 RX ADMIN — VANCOMYCIN HYDROCHLORIDE 1 MLS/HR: 1 INJECTION, POWDER, LYOPHILIZED, FOR SOLUTION INTRAVENOUS at 22:04

## 2019-06-12 NOTE — PN
Date/Time of Note


Date/Time of Note


DATE: 6/12/19 


TIME: 13:39





Assessment/Plan


Lines/Catheters


IV Catheter Type (from [facility]):  Peripheral IV


Kaye in Place (from Nrs):  No





Assessment/Plan


Chief Complaint/Hosp Course


1.  Left gluteal abscess: Status post I&D 6/3/2019; I&D of left buttock 6/11/19


-local care w luz maria


-IV antibiotics per sensi's


-Pain management


2.  Leukocytosis:improved


-As above


-Trend


3.Hyperkalemia:


-Optimize electrolytes


4.  Elevated ALT and alkaline phosphatase: Improving


-Trend


-Further work-up if persistent


5.  Normocytic normochromic anemia:


-Monitor and transfuse as needed


6.  Asthma history:


-Med management


7.  Illicit drug use:


-Highly encourage cessation





Thank you.  Patient seen and examined in collaboration with Dr. Chriss Carbajal.





Subjective


24 Hr Interval Summary


Feels better.  Continues to have left buttock discomfort however much improved. 


Copious drainage from left buttock area.  No fevers, chills, sob, congested 


cough, cp, palpitations, ha, dizziness, nausea, vomiting, diarrhea, dysuria.





Exam/Review of Systems


Vital Signs


Vitals





Vital Signs


  Date      Temp  Pulse  Resp  B/P (MAP)   Pulse Ox  O2          O2 Flow    FiO2


Time                                                 Delivery    Rate


   6/12/19  98.0     90    18  87/53 (64)        99


     07:56


   6/10/19                                           Room Air


     20:53








Intake and Output





6/11/19 6/11/19 6/12/19





1414:59


22:59


06:59





IntakeIntake Total


350 ml


1520 ml


500 ml





OutputOutput Total


800 ml


600 ml





BalanceBalance


350 ml


720 ml


-100 ml














Exam


Free Text/Dictation


Constitutional:  alert, oriented


Psych:  nl mood/affect, anxiety


Head:  normocephalic, atraumatic


Eyes:  nl conjunctiva, EOMI, nl lids, nl sclera


ENMT:  nl external ears & nose, nl lips & teeth, nl nasal mucosa & septum, 


mucosa pink and moist


Neck:  supple, non-tender


Respiratory:  normal air movement; 


   No congested cough


Cardiovascular:  regular rate and rhythm, nl pulses


Gastrointestinal:  soft, non-tender


Musculoskeletal:  nl extremities to inspection, nl gait and stance


Extremities:  normal pulses


Neurological:  nl mental status, nl speech, nl strength


Skin:  nl turgor, other (Left buttock: Packed, much improved edema, no overt 


bleeding| left hip: Open wounds: Min drainage)





Results


Result Diagram:  


6/11/19 0552                                                                    


           6/11/19 0552














CHOLO KIRKPATRICK NP       Jun 12, 2019 13:42

## 2019-06-13 VITALS — SYSTOLIC BLOOD PRESSURE: 102 MMHG | RESPIRATION RATE: 18 BRPM | HEART RATE: 87 BPM | DIASTOLIC BLOOD PRESSURE: 60 MMHG

## 2019-06-13 VITALS — SYSTOLIC BLOOD PRESSURE: 100 MMHG | DIASTOLIC BLOOD PRESSURE: 65 MMHG | HEART RATE: 111 BPM | RESPIRATION RATE: 18 BRPM

## 2019-06-13 VITALS — HEART RATE: 99 BPM | RESPIRATION RATE: 19 BRPM | SYSTOLIC BLOOD PRESSURE: 106 MMHG | DIASTOLIC BLOOD PRESSURE: 63 MMHG

## 2019-06-13 VITALS — HEART RATE: 81 BPM | RESPIRATION RATE: 20 BRPM | SYSTOLIC BLOOD PRESSURE: 101 MMHG | DIASTOLIC BLOOD PRESSURE: 51 MMHG

## 2019-06-13 LAB
ADD MAN DIFF?: NO
ALANINE AMINOTRANSFERASE: 60 IU/L (ref 13–69)
ALBUMIN/GLOBULIN RATIO: 0.87
ALBUMIN: 2.8 G/DL (ref 3.3–4.9)
ALKALINE PHOSPHATASE: 118 IU/L (ref 42–121)
ANION GAP: 6 (ref 5–13)
ASPARTATE AMINO TRANSFERASE: 18 IU/L (ref 15–46)
BASOPHIL #: 0 10^3/UL (ref 0–0.1)
BASOPHILS %: 0.5 % (ref 0–2)
BILIRUBIN,DIRECT: 0 MG/DL (ref 0–0.2)
BILIRUBIN,TOTAL: 0.2 MG/DL (ref 0.2–1.3)
BLOOD UREA NITROGEN: 9 MG/DL (ref 7–20)
CALCIUM: 8.5 MG/DL (ref 8.4–10.2)
CARBON DIOXIDE: 29 MMOL/L (ref 21–31)
CHLORIDE: 104 MMOL/L (ref 97–110)
CREATININE: 0.64 MG/DL (ref 0.61–1.24)
EOSINOPHILS #: 0.1 10^3/UL (ref 0–0.5)
EOSINOPHILS %: 1.9 % (ref 0–7)
GLOBULIN: 3.2 G/DL (ref 1.3–3.2)
GLUCOSE: 100 MG/DL (ref 70–220)
HEMATOCRIT: 29.6 % (ref 42–52)
HEMOGLOBIN: 9.5 G/DL (ref 14–18)
HEPATITIS B CORE ANTIBODY: NEGATIVE
HEPATITIS B SURFACE ANTIBODY: NEGATIVE
HEPATITIS B SURFACE ANTIGEN: NEGATIVE
HEPATITIS C VIRAL ANTIBODY: NEGATIVE
IMMATURE GRANS #M: 0.14 10^3/UL (ref 0–0.03)
IMMATURE GRANS % (M): 1.9 % (ref 0–0.43)
LYMPHOCYTES #: 1.3 10^3/UL (ref 0.8–2.9)
LYMPHOCYTES %: 17.8 % (ref 15–51)
MAGNESIUM: 2 MG/DL (ref 1.7–2.5)
MEAN CORPUSCULAR HEMOGLOBIN: 29.9 PG (ref 29–33)
MEAN CORPUSCULAR HGB CONC: 32.1 G/DL (ref 32–37)
MEAN CORPUSCULAR VOLUME: 93.1 FL (ref 82–101)
MEAN PLATELET VOLUME: 8.1 FL (ref 7.4–10.4)
MONOCYTE #: 0.7 10^3/UL (ref 0.3–0.9)
MONOCYTES %: 9.3 % (ref 0–11)
NEUTROPHIL #: 5.2 10^3/UL (ref 1.6–7.5)
NEUTROPHILS %: 68.6 % (ref 39–77)
NUCLEATED RED BLOOD CELLS #: 0 10^3/UL (ref 0–0)
NUCLEATED RED BLOOD CELLS%: 0 /100WBC (ref 0–0)
PHOSPHORUS: 3.3 MG/DL (ref 2.5–4.9)
PLATELET COUNT: 463 10^3/UL (ref 140–415)
POTASSIUM: 4.3 MMOL/L (ref 3.5–5.1)
RED BLOOD COUNT: 3.18 10^6/UL (ref 4.7–6.1)
RED CELL DISTRIBUTION WIDTH: 12.5 % (ref 11.5–14.5)
SODIUM: 139 MMOL/L (ref 135–144)
TOTAL PROTEIN: 6 G/DL (ref 6.1–8.1)
VANCOMYCIN,TROUGH: 17.2 UG/ML (ref 10–20)
WHITE BLOOD COUNT: 7.5 10^3/UL (ref 4.8–10.8)

## 2019-06-13 RX ADMIN — VANCOMYCIN HYDROCHLORIDE 1 MLS/HR: 1 INJECTION, POWDER, LYOPHILIZED, FOR SOLUTION INTRAVENOUS at 21:51

## 2019-06-13 RX ADMIN — PIPERACILLIN SODIUM AND TAZOBACTAM SODIUM SCH MLS/HR: 3; .375 INJECTION, POWDER, LYOPHILIZED, FOR SOLUTION INTRAVENOUS at 18:14

## 2019-06-13 RX ADMIN — PIPERACILLIN SODIUM AND TAZOBACTAM SODIUM SCH MLS/HR: 3; .375 INJECTION, POWDER, LYOPHILIZED, FOR SOLUTION INTRAVENOUS at 00:18

## 2019-06-13 RX ADMIN — VANCOMYCIN HYDROCHLORIDE 1 MLS/HR: 1 INJECTION, POWDER, LYOPHILIZED, FOR SOLUTION INTRAVENOUS at 06:55

## 2019-06-13 RX ADMIN — FOLIC ACID SCH MLS/HR: 5 INJECTION, SOLUTION INTRAMUSCULAR; INTRAVENOUS; SUBCUTANEOUS at 00:18

## 2019-06-13 RX ADMIN — SODIUM HYPOCHLORITE 1 APPLIC: 0.12 SOLUTION TOPICAL at 09:00

## 2019-06-13 RX ADMIN — HYDROCODONE BITARTRATE AND ACETAMINOPHEN 1 TAB: 5; 325 TABLET ORAL at 16:13

## 2019-06-13 RX ADMIN — PIPERACILLIN SODIUM AND TAZOBACTAM SODIUM 1 MLS/HR: 3; .375 INJECTION, POWDER, LYOPHILIZED, FOR SOLUTION INTRAVENOUS at 06:21

## 2019-06-13 RX ADMIN — VANCOMYCIN HYDROCHLORIDE SCH MLS/HR: 1 INJECTION, POWDER, LYOPHILIZED, FOR SOLUTION INTRAVENOUS at 06:55

## 2019-06-13 RX ADMIN — FOLIC ACID SCH MLS/HR: 5 INJECTION, SOLUTION INTRAMUSCULAR; INTRAVENOUS; SUBCUTANEOUS at 15:12

## 2019-06-13 RX ADMIN — PIPERACILLIN SODIUM AND TAZOBACTAM SODIUM 1 MLS/HR: 3; .375 INJECTION, POWDER, LYOPHILIZED, FOR SOLUTION INTRAVENOUS at 18:14

## 2019-06-13 RX ADMIN — MORPHINE SULFATE 1 MG: 2 INJECTION, SOLUTION INTRAMUSCULAR; INTRAVENOUS at 23:07

## 2019-06-13 RX ADMIN — MICONAZOLE NITRATE SCH APPLIC: 20 CREAM TOPICAL at 23:06

## 2019-06-13 RX ADMIN — THIAMINE HYDROCHLORIDE 1 MLS/HR: 100 INJECTION, SOLUTION INTRAMUSCULAR; INTRAVENOUS at 00:18

## 2019-06-13 RX ADMIN — VANCOMYCIN HYDROCHLORIDE SCH MLS/HR: 1 INJECTION, POWDER, LYOPHILIZED, FOR SOLUTION INTRAVENOUS at 21:51

## 2019-06-13 RX ADMIN — PIPERACILLIN SODIUM AND TAZOBACTAM SODIUM SCH MLS/HR: 3; .375 INJECTION, POWDER, LYOPHILIZED, FOR SOLUTION INTRAVENOUS at 12:16

## 2019-06-13 RX ADMIN — MICONAZOLE NITRATE SCH APPLIC: 20 CREAM TOPICAL at 23:07

## 2019-06-13 RX ADMIN — MICONAZOLE NITRATE 1 APPLIC: 20 CREAM TOPICAL at 23:07

## 2019-06-13 RX ADMIN — MORPHINE SULFATE PRN MG: 2 INJECTION, SOLUTION INTRAMUSCULAR; INTRAVENOUS at 16:13

## 2019-06-13 RX ADMIN — PIPERACILLIN SODIUM AND TAZOBACTAM SODIUM 1 MLS/HR: 3; .375 INJECTION, POWDER, LYOPHILIZED, FOR SOLUTION INTRAVENOUS at 00:18

## 2019-06-13 RX ADMIN — HYDROCODONE BITARTRATE AND ACETAMINOPHEN PRN TAB: 5; 325 TABLET ORAL at 16:13

## 2019-06-13 RX ADMIN — MICONAZOLE NITRATE 1 APPLIC: 20 CREAM TOPICAL at 23:06

## 2019-06-13 RX ADMIN — VANCOMYCIN HYDROCHLORIDE 1 MLS/HR: 1 INJECTION, POWDER, LYOPHILIZED, FOR SOLUTION INTRAVENOUS at 14:15

## 2019-06-13 RX ADMIN — THIAMINE HYDROCHLORIDE 1 MLS/HR: 100 INJECTION, SOLUTION INTRAMUSCULAR; INTRAVENOUS at 15:12

## 2019-06-13 RX ADMIN — MORPHINE SULFATE 1 MG: 2 INJECTION, SOLUTION INTRAMUSCULAR; INTRAVENOUS at 16:13

## 2019-06-13 RX ADMIN — PIPERACILLIN SODIUM AND TAZOBACTAM SODIUM 1 MLS/HR: 3; .375 INJECTION, POWDER, LYOPHILIZED, FOR SOLUTION INTRAVENOUS at 12:16

## 2019-06-13 RX ADMIN — PIPERACILLIN SODIUM AND TAZOBACTAM SODIUM SCH MLS/HR: 3; .375 INJECTION, POWDER, LYOPHILIZED, FOR SOLUTION INTRAVENOUS at 06:21

## 2019-06-13 RX ADMIN — VANCOMYCIN HYDROCHLORIDE SCH MLS/HR: 1 INJECTION, POWDER, LYOPHILIZED, FOR SOLUTION INTRAVENOUS at 14:15

## 2019-06-13 NOTE — CONS
Assessment/Plan


Assessment/Plan


Assessment/Plan (Daily)


Left gluteal abscess


He has been seen by infectious disease on IV antibiotic coverage


Status post debridement by surgery


IV heroin abuse multiple times daily for the last 26 years


Status post incarceration secondary to the above


Homelessness


Actively smokes with a history of COPD


Has history of multiple admissions to treatment programs


History of heroin use last time months ago at that time he was taking up to 80 


mg of methadone daily.





Will restart methadone at 30 mg daily continue with his current morphine 


discontinue Norco consider rechecking HIV and hepatitis C panel.





Consultation Date/Type/Reason


Admit Date/Time





Date/Time of Note


DATE: 6/13/19 


TIME: 17:32





Hx of Present Illness


Chart reviewed patient examined.  Patient was admitted to [facility] with a left gluteal abscess secondary to IM injections of heroin.  


Patient also has a methamphetamine addiction.  He uses heroin  multiple times 


daily since the age of 12.  He has been incarcerated in the past and is been in 


multiple drug treatment programs which has failed.  Denies any other illicit 


drug use he uses HHN's for history of COPD.  But denies any other opioids, non-


drinker but he does smoke.  States that he is HIV negative and hepatitis C 


negative, he is also homeless.  He has a family history of addictions with 


patient's father grandfather and sister.  There is no history of suicide 


attempts and he does not feel he is actively withdrawing.


Constitutional:  no complaints, improved


ENT:  no complaints


Respiratory:  no complaints, other (COPD)


Cardiovascular:  no complaints


Gastrointestinal:  no complaints


Musculoskeletal:  no complaints


Skin:  other (Left gluteal abscess)





Past Medical History


Medical History:  other (COPD)


Home Meds


Active Scripts


Amoxicillin* (Amoxicillin*) 500 Mg Cap, 500 MG PO BID for 7 Days, CAP


   Prov:LORNA,ARNAV C         2/28/18


Ibuprofen* (Motrin*) 600 Mg Tab, 600 MG PO Q6, #30 TAB


   Prov:LORNA,ARNAV C         2/28/18


Medications





Current Medications


IV Flush (NS 3 ml) 3 ml PER PROTOCOL IV ;  Start 6/10/19 at 20:30


Ondansetron HCl (Zofran Inj) 4 mg Q6H  PRN IV NAUSEA/VOMITING;  Start 6/10/19 at


20:30


Acetaminophen (Tylenol Tab) 650 mg Q6H  PRN PO .PAIN 1-3 OR TEMP;  Start 6/10/19


at 20:30


Acetaminophen/ Hydrocodone Bitart (Norco (5/325)) 1 tab Q6H  PRN PO .MOD PAIN 4-


6 Last administered on 6/13/19at 16:13; Admin Dose 1 TAB;  Start 6/10/19 at 


20:30


Morphine Sulfate (morphine) 2 mg Q4H  PRN IV .SEVERE PAIN 7-10 Last administered


on 6/13/19at 16:13; Admin Dose 2 MG;  Start 6/10/19 at 20:30


Docusate Sodium (Colace) 100 mg Q12H  PRN PO .CONSTIPATION;  Start 6/10/19 at 


20:30


Bisacodyl (Dulcolax) 5 mg DAILY  PRN PO .CONSTIPATION;  Start 6/10/19 at 20:30


Vancomycin HCl (Vanco Iv Per Pharmacy) VANCOMYCIN PER PHARMACY PER PROTOCOL XX ;


 Start 6/10/19 at 20:30


Piperacillin Sod/ Tazobactam Sod 100 ml @  200 mls/hr Q6 IVPB  Last administered


on 6/13/19at 12:16; Admin Dose 200 MLS/HR;  Start 6/11/19 at 00:00


Vancomycin HCl 250 ml @  125 mls/hr Q8H IVPB  Last administered on 6/13/19at 


14:15; Admin Dose 125 MLS/HR;  Start 6/11/19 at 05:00


Sodium Chloride 1,000 ml @  70 mls/hr [id number] IV  Last administered on 6/13/19at


00:18; Admin Dose 70 MLS/HR;  Start 6/11/19 at 06:00


Sodium Hypochlorite (Dakins Diluted (1/40)) 1 applic DAILY TP  Last administered


on 6/12/19at 15:56; Admin Dose 1 APPLIC;  Start 6/12/19 at 14:00


Miscellaneous Information (*Rx Drug Level Order Reminder*) VANCO TROUGH ON 


06/1... 2100  ONCE XX ;  Start 6/13/19 at 21:00;  Stop 6/13/19 at 21:01


Allergies:  


Coded Allergies:  


     No Known Drug Allergies (Verified  Allergy, Unknown, 2/28/18)





Past Surgical History


Past Surgical Hx:  other





Social History


Alcohol Use:  sober


Smoking Status:  Current every day smoker


Drug Use:  heroin, other (Methamphetamine)





Exam/Review of Systems


Exam


Vitals





Vital Signs


  Date      Temp  Pulse  Resp  B/P (MAP)   Pulse Ox  O2          O2 Flow    FiO2


Time                                                 Delivery    Rate


   6/13/19  98.3     81    20      101/51        98


     14:55                           (68)


   6/10/19                                           Room Air


     20:53








Intake and Output





6/12/19 6/12/19 6/13/19





1515:00


23:00


07:00





IntakeIntake Total


1190 ml


1850 ml


1900 ml





OutputOutput Total


1000 ml


1100 ml





BalanceBalance


1190 ml


850 ml


800 ml











Constitutional:  alert, oriented, well developed


Psych:  anxiety


Eyes:  nl conjunctiva, EOMI, nl lids, nl sclera, PERRL


ENMT:  nl external ears & nose, nl lips & teeth, nl nasal mucosa & septum


Respiratory:  clear to auscultation, normal air movement


Neurological:  CNS II-XII intact, nl mental status, nl speech, nl strength





Results


Result Diagram:  


6/13/19 0708                                                                    


           6/13/19 0708





Results 24hrs





Laboratory Tests


               Test
                                6/13/19
07:08


               White Blood Count                            7.5


               Red Blood Count                            3.18  L


               Hemoglobin                                  9.5  L


               Hematocrit                                 29.6  L


               Mean Corpuscular Volume                     93.1


               Mean Corpuscular Hemoglobin                 29.9


               Mean Corpuscular Hemoglobin
Concent        32.1  



               Red Cell Distribution Width                 12.5


               Platelet Count                              463  H


               Mean Platelet Volume                         8.1


               Immature Granulocytes %                   1.900  H


               Neutrophils %                               68.6


               Lymphocytes %                               17.8


               Monocytes %                                  9.3


               Eosinophils %                                1.9


               Basophils %                                  0.5


               Nucleated Red Blood Cells %                  0.0


               Immature Granulocytes #                   0.140  H


               Neutrophils #                                5.2


               Lymphocytes #                                1.3


               Monocytes #                                  0.7


               Eosinophils #                                0.1


               Basophils #                                  0.0


               Nucleated Red Blood Cells #                  0.0


               Sodium Level                                 139


               Potassium Level                              4.3


               Chloride Level                               104


               Carbon Dioxide Level                          29


               Anion Gap                                      6


               Blood Urea Nitrogen                            9


               Creatinine                                  0.64


               Est Glomerular Filtrat Rate
mL/min   > 60  



               Glucose Level                                100


               Calcium Level                                8.5


               Phosphorus Level                             3.3


               Magnesium Level                              2.0


               Total Bilirubin                              0.2


               Direct Bilirubin                            0.00


               Indirect Bilirubin                           0.2


               Aspartate Amino Transf
(AST/SGOT)            18  



               Alanine Aminotransferase
(ALT/SGPT)          60  



               Alkaline Phosphatase                         118


               Total Protein                               6.0  L


               Albumin                                     2.8  L


               Globulin                                    3.20


               Albumin/Globulin Ratio                      0.87


               Hepatitis B Surface Antigen          NEGATIVE


               Hepatitis B Surface Antibody         NEGATIVE


               Hepatitis B Core Total
Antibody      NEGATIVE  



               Hepatitis C Antibody                 Pending








Medications


Medication





Current Medications


IV Flush (NS 3 ml) 3 ml PER PROTOCOL IV ;  Start 6/10/19 at 20:30


Ondansetron HCl (Zofran Inj) 4 mg Q6H  PRN IV NAUSEA/VOMITING;  Start 6/10/19 at


20:30


Acetaminophen (Tylenol Tab) 650 mg Q6H  PRN PO .PAIN 1-3 OR TEMP;  Start 6/10/19


at 20:30


Acetaminophen/ Hydrocodone Bitart (Norco (5/325)) 1 tab Q6H  PRN PO .MOD PAIN 4-


6 Last administered on 6/13/19at 16:13; Admin Dose 1 TAB;  Start 6/10/19 at 


20:30


Morphine Sulfate (morphine) 2 mg Q4H  PRN IV .SEVERE PAIN 7-10 Last administered


on 6/13/19at 16:13; Admin Dose 2 MG;  Start 6/10/19 at 20:30


Docusate Sodium (Colace) 100 mg Q12H  PRN PO .CONSTIPATION;  Start 6/10/19 at 


20:30


Bisacodyl (Dulcolax) 5 mg DAILY  PRN PO .CONSTIPATION;  Start 6/10/19 at 20:30


Vancomycin HCl (Vanco Iv Per Pharmacy) VANCOMYCIN PER PHARMACY PER PROTOCOL XX ;


 Start 6/10/19 at 20:30


Piperacillin Sod/ Tazobactam Sod 100 ml @  200 mls/hr Q6 IVPB  Last administered


on 6/13/19at 12:16; Admin Dose 200 MLS/HR;  Start 6/11/19 at 00:00


Vancomycin HCl 250 ml @  125 mls/hr Q8H IVPB  Last administered on 6/13/19at 


14:15; Admin Dose 125 MLS/HR;  Start 6/11/19 at 05:00


Sodium Chloride 1,000 ml @  70 mls/hr [id number] IV  Last administered on 6/13/19at


00:18; Admin Dose 70 MLS/HR;  Start 6/11/19 at 06:00


Sodium Hypochlorite (Dakins Diluted (1/40)) 1 applic DAILY TP  Last administered


on 6/12/19at 15:56; Admin Dose 1 APPLIC;  Start 6/12/19 at 14:00


Miscellaneous Information (*Rx Drug Level Order Reminder*) VANCO TROUGH ON 


06/1... 2100  ONCE XX ;  Start 6/13/19 at 21:00;  Stop 6/13/19 at 21:01











OLEKSANDR LOYD             Jun 13, 2019 17:36

## 2019-06-13 NOTE — PN
Date/Time of Note


Date/Time of Note


DATE: 6/13/19 


TIME: 03:50





Assessment/Plan


VTE Prophylaxis


Risk score (from Ns)>0 risk:  1


SCD applied (from Ns):  Yes


Pharmacological prophylaxis:  NA/contraindicated


Pharm contraindication:  low risk/ambulating





Lines/Catheters


IV Catheter Type (from [facility]):  Peripheral IV


Urinary Cath still in place:  No





Assessment/Plan


Hospital Course


S: no new complaints , pain is fairly well controlled on current regimen





Objective : 


General: A&O x3, answering questions appropriately


HEENT: NC/ AT. PERRL. EOM intact


Neck: supple


CVS: S1, S2, RRR.  no murmurs. no pain on chest wall palpation


Lungs: CTA b/l. no wheezing or rhonchi


Abd: soft, nontender, +BS


Ext: moving all extremities


skin: no rashes


back : L buttock with multiple injection marks and incision from abscess 


drainage covered with packing and dressing, with surrounding cellulitis 





Assessment and PLan: 





1.  Left gluteal abscess: 


   -Status post I&D 6/3/2019,Repeat  I&D of left buttock 6/11/19


   -f/u intraop cultures, continue IV antibiotics


   -Pain management


2.  Leukocytosis:


   -As above


   -Trend


3.Hyperkalemia:


   -f/u repeat labs 


4.  Elevated ALT and alkaline phosphatase: Improving


   -Trend


   -Hepatitis profile 


   -possible mild hepatic injury from dug use 


5.  Normocytic normochromic anemia:


   -Monitor and transfuse as needed


6.  Asthma history:


   -Med management


7.  Illicit drug use:


   -Cessation counselling done and re-iforced daily 





8. Homeless: 


   -Patient can go to recuperative care once cultures are available and may be 


able to transition to friends/ family from there 








Further interventions per clinical course 


.


Result Diagram:  


6/12/19 1434                                                                    


           6/11/19 0552





Results 24hrs





Laboratory Tests


       Test
                                6/12/19
04:39  6/12/19
14:34


       Vancomycin Level Trough                     10.6


       White Blood Count                                          8.1  #


       Red Blood Count                                           3.19  L


       Hemoglobin                                                 9.6  L


       Hematocrit                                                29.6  L


       Mean Corpuscular Volume                                    92.8


       Mean Corpuscular Hemoglobin                                30.1


       Mean Corpuscular Hemoglobin
Concent  
                    32.4  



       Red Cell Distribution Width                                12.7


       Platelet Count                                              406


       Mean Platelet Volume                                        8.3


       Immature Granulocytes %                                  0.700  H


       Neutrophils %                                              64.6


       Lymphocytes %                                              24.1


       Monocytes %                                                 7.9


       Eosinophils %                                               2.2


       Basophils %                                                 0.5


       Nucleated Red Blood Cells %                                 0.0


       Immature Granulocytes #                                  0.060  H


       Neutrophils #                                               5.2


       Lymphocytes #                                               2.0


       Monocytes #                                                 0.6


       Eosinophils #                                               0.2


       Basophils #                                                 0.0


       Nucleated Red Blood Cells #                                 0.0








Exam/Review of Systems


Exam


Vitals





Vital Signs


  Date      Temp  Pulse  Resp  B/P (MAP)   Pulse Ox  O2          O2 Flow    FiO2


Time                                                 Delivery    Rate


   6/12/19  98.3     95    19      105/63       100


     20:00                           (77)


   6/10/19                                           Room Air


     20:53








Intake and Output





6/12/19 6/12/19 6/13/19





1515:00


23:00


07:00





IntakeIntake Total


1190 ml


1850 ml


750 ml





OutputOutput Total


1000 ml





BalanceBalance


1190 ml


850 ml


750 ml














Results


Results 24hrs





Laboratory Tests


       Test
                                6/12/19
04:39  6/12/19
14:34


       Vancomycin Level Trough                     10.6


       White Blood Count                                          8.1  #


       Red Blood Count                                           3.19  L


       Hemoglobin                                                 9.6  L


       Hematocrit                                                29.6  L


       Mean Corpuscular Volume                                    92.8


       Mean Corpuscular Hemoglobin                                30.1


       Mean Corpuscular Hemoglobin
Concent  
                    32.4  



       Red Cell Distribution Width                                12.7


       Platelet Count                                              406


       Mean Platelet Volume                                        8.3


       Immature Granulocytes %                                  0.700  H


       Neutrophils %                                              64.6


       Lymphocytes %                                              24.1


       Monocytes %                                                 7.9


       Eosinophils %                                               2.2


       Basophils %                                                 0.5


       Nucleated Red Blood Cells %                                 0.0


       Immature Granulocytes #                                  0.060  H


       Neutrophils #                                               5.2


       Lymphocytes #                                               2.0


       Monocytes #                                                 0.6


       Eosinophils #                                               0.2


       Basophils #                                                 0.0


       Nucleated Red Blood Cells #                                 0.0








Medications


Medication





Current Medications


IV Flush (NS 3 ml) 3 ml PER PROTOCOL IV ;  Start 6/10/19 at 20:30


Ondansetron HCl (Zofran Inj) 4 mg Q6H  PRN IV NAUSEA/VOMITING;  Start 6/10/19 at


20:30


Acetaminophen (Tylenol Tab) 650 mg Q6H  PRN PO .PAIN 1-3 OR TEMP;  Start 6/10/19


at 20:30


Acetaminophen/ Hydrocodone Bitart (Norco (5/325)) 1 tab Q6H  PRN PO .MOD PAIN 4-


6 Last administered on 6/12/19at 15:56; Admin Dose 1 TAB;  Start 6/10/19 at 


20:30


Morphine Sulfate (morphine) 2 mg Q4H  PRN IV .SEVERE PAIN 7-10 Last administered


on 6/11/19at 22:59; Admin Dose 2 MG;  Start 6/10/19 at 20:30


Docusate Sodium (Colace) 100 mg Q12H  PRN PO .CONSTIPATION;  Start 6/10/19 at 


20:30


Bisacodyl (Dulcolax) 5 mg DAILY  PRN PO .CONSTIPATION;  Start 6/10/19 at 20:30


Vancomycin HCl (Vanco Iv Per Pharmacy) VANCOMYCIN PER PHARMACY PER PROTOCOL XX ;


 Start 6/10/19 at 20:30


Piperacillin Sod/ Tazobactam Sod 100 ml @  200 mls/hr Q6 IVPB  Last administered


on 6/13/19at 00:18; Admin Dose 200 MLS/HR;  Start 6/11/19 at 00:00


Vancomycin HCl 250 ml @  125 mls/hr Q8H IVPB  Last administered on 6/12/19at 22:


04; Admin Dose 125 MLS/HR;  Start 6/11/19 at 05:00


Sodium Chloride 1,000 ml @  70 mls/hr [id number] IV  Last administered on 6/13/19at


00:18; Admin Dose 70 MLS/HR;  Start 6/11/19 at 06:00


Sodium Hypochlorite (Dakins Diluted (1/40)) 1 applic DAILY TP  Last administered


on 6/12/19at 15:56; Admin Dose 1 APPLIC;  Start 6/12/19 at 14:00











MARLIN CANAS                Jun 13, 2019 04:00

## 2019-06-13 NOTE — PN
Date/Time of Note


Date/Time of Note


DATE: 6/13/19 


TIME: 13:35





Assessment/Plan


Lines/Catheters


IV Catheter Type (from Nrs):  Peripheral IV


Kaye in Place (from Nrs):  No





Assessment/Plan


Chief Complaint/Hosp Course


1.  Left gluteal abscess: Status post I&D 6/3/2019; I&D of left buttock 6/11/19


-local care w dakins> had lengthy discussion with patient regarding importance 


of wound packing 


-antibiotics per sensi's


-Pain management


-DC okay from surgical standpoint with wound care


2.  Leukocytosis: Resolved


-As above


-Trend


3.Hyperkalemia:


-Optimize electrolytes


4.  Elevated ALT and alkaline phosphatase: Improving; hepatitis panel negative 


thus far


-Trend


-Further work-up if persistent


5.  Normocytic normochromic anemia:


-Monitor and transfuse as needed


6.  Asthma history:


-Med management


7.  Illicit drug use:


-Highly encourage cessation





Thank you.  Patient seen and examined in collaboration with Dr. Chriss Carbajal.





Subjective


24 Hr Interval Summary


Refusing wound care intermittently.  Had lengthy discussion with patient 


regarding importance of packing the wound.  Left buttock pain improved.  No 


fevers, chills, sob, congested cough, cp, palpitations, ha, dizziness, nausea, 


vomiting, diarrhea, dysuria.





Exam/Review of Systems


Vital Signs


Vitals





Vital Signs


  Date      Temp  Pulse  Resp  B/P (MAP)   Pulse Ox  O2          O2 Flow    FiO2


Time                                                 Delivery    Rate


   6/13/19  97.9     87    18      102/60       100


     08:55                           (74)


   6/10/19                                           Room Air


     20:53








Intake and Output





6/12/19 6/12/19 6/13/19





1414:59


22:59


06:59





IntakeIntake Total


1190 ml


1850 ml


1900 ml





OutputOutput Total


1000 ml


1100 ml





BalanceBalance


1190 ml


850 ml


800 ml














Exam


Free Text/Dictation


Constitutional:  alert, oriented


Psych:  nl mood/affect, anxiety


Head:  normocephalic, atraumatic


Eyes:  nl conjunctiva, EOMI, nl lids, nl sclera


ENMT:  nl external ears & nose, nl lips & teeth, nl nasal mucosa & septum, 


mucosa pink and moist


Neck:  supple, non-tender


Respiratory:  normal air movement; 


   No congested cough


Cardiovascular:  regular rate and rhythm, nl pulses


Gastrointestinal:  soft, non-tender


Musculoskeletal:  nl extremities to inspection, nl gait and stance


Extremities:  normal pulses


Neurological:  nl mental status, nl speech, nl strength


Skin:  nl turgor, other (Left buttock: Packed, much improved edema, no overt 


bleeding| left hip: Open wounds: Min drainage)





Results


Result Diagram:  


6/13/19 0708                                                                    


           6/13/19 0708














CHOLO KIRKPATRICK NP       Jun 13, 2019 13:38

## 2019-06-13 NOTE — PN
Date/Time of Note


Date/Time of Note


DATE: 6/13/19 


TIME: 17:46





Assessment/Plan


VTE Prophylaxis


Risk score (from Ns)>0 risk:  1


SCD applied (from Ns):  Yes


SCD contraindicated:  low risk/ambulating


Pharmacological prophylaxis:  LMWH





Lines/Catheters


IV Catheter Type (from Nrs):  Peripheral IV


Urinary Cath still in place:  No





Assessment/Plan


Hospital Course





A/P


1. Gluteal Abscess; sp debridement. finish antibiotics. f/u cultures


2. Groin fungal rash


3. Substance abuse; Heroin, unable to go to detox program until wound has 


healed.


4. Tobacco abuse


5.  Failure to thrive, plan is to be discharged to recuperative care but will 


need extensive wound care.


6.  Nonadherence


7.  History of recent JEZ and infection of his hip.  Left AMA





S: rash/ uncomfortable





O: vss





PE


No pallor adenopathy


Regular no murmur gallop


Clear


Benign


Groin rash note


No peripheral edema


Result Diagram:  


6/13/19 0708                                                                    


           6/13/19 0708





Results 24hrs





Laboratory Tests


               Test
                                6/13/19
07:08


               White Blood Count                            7.5


               Red Blood Count                            3.18  L


               Hemoglobin                                  9.5  L


               Hematocrit                                 29.6  L


               Mean Corpuscular Volume                     93.1


               Mean Corpuscular Hemoglobin                 29.9


               Mean Corpuscular Hemoglobin
Concent        32.1  



               Red Cell Distribution Width                 12.5


               Platelet Count                              463  H


               Mean Platelet Volume                         8.1


               Immature Granulocytes %                   1.900  H


               Neutrophils %                               68.6


               Lymphocytes %                               17.8


               Monocytes %                                  9.3


               Eosinophils %                                1.9


               Basophils %                                  0.5


               Nucleated Red Blood Cells %                  0.0


               Immature Granulocytes #                   0.140  H


               Neutrophils #                                5.2


               Lymphocytes #                                1.3


               Monocytes #                                  0.7


               Eosinophils #                                0.1


               Basophils #                                  0.0


               Nucleated Red Blood Cells #                  0.0


               Sodium Level                                 139


               Potassium Level                              4.3


               Chloride Level                               104


               Carbon Dioxide Level                          29


               Anion Gap                                      6


               Blood Urea Nitrogen                            9


               Creatinine                                  0.64


               Est Glomerular Filtrat Rate
mL/min   > 60  



               Glucose Level                                100


               Calcium Level                                8.5


               Phosphorus Level                             3.3


               Magnesium Level                              2.0


               Total Bilirubin                              0.2


               Direct Bilirubin                            0.00


               Indirect Bilirubin                           0.2


               Aspartate Amino Transf
(AST/SGOT)            18  



               Alanine Aminotransferase
(ALT/SGPT)          60  



               Alkaline Phosphatase                         118


               Total Protein                               6.0  L


               Albumin                                     2.8  L


               Globulin                                    3.20


               Albumin/Globulin Ratio                      0.87


               Hepatitis B Surface Antigen          NEGATIVE


               Hepatitis B Surface Antibody         NEGATIVE


               Hepatitis B Core Total
Antibody      NEGATIVE  



               Hepatitis C Antibody                 Pending








Exam/Review of Systems


Exam


Vitals





Vital Signs


  Date      Temp  Pulse  Resp  B/P (MAP)   Pulse Ox  O2          O2 Flow    FiO2


Time                                                 Delivery    Rate


   6/13/19  98.3     81    20      101/51        98


     14:55                           (68)


   6/10/19                                           Room Air


     20:53








Intake and Output





6/12/19 6/12/19 6/13/19





1515:00


23:00


07:00





IntakeIntake Total


1190 ml


1850 ml


1900 ml





OutputOutput Total


1000 ml


1100 ml





BalanceBalance


1190 ml


850 ml


800 ml














Results


Results 24hrs





Laboratory Tests


               Test
                                6/13/19
07:08


               White Blood Count                            7.5


               Red Blood Count                            3.18  L


               Hemoglobin                                  9.5  L


               Hematocrit                                 29.6  L


               Mean Corpuscular Volume                     93.1


               Mean Corpuscular Hemoglobin                 29.9


               Mean Corpuscular Hemoglobin
Concent        32.1  



               Red Cell Distribution Width                 12.5


               Platelet Count                              463  H


               Mean Platelet Volume                         8.1


               Immature Granulocytes %                   1.900  H


               Neutrophils %                               68.6


               Lymphocytes %                               17.8


               Monocytes %                                  9.3


               Eosinophils %                                1.9


               Basophils %                                  0.5


               Nucleated Red Blood Cells %                  0.0


               Immature Granulocytes #                   0.140  H


               Neutrophils #                                5.2


               Lymphocytes #                                1.3


               Monocytes #                                  0.7


               Eosinophils #                                0.1


               Basophils #                                  0.0


               Nucleated Red Blood Cells #                  0.0


               Sodium Level                                 139


               Potassium Level                              4.3


               Chloride Level                               104


               Carbon Dioxide Level                          29


               Anion Gap                                      6


               Blood Urea Nitrogen                            9


               Creatinine                                  0.64


               Est Glomerular Filtrat Rate
mL/min   > 60  



               Glucose Level                                100


               Calcium Level                                8.5


               Phosphorus Level                             3.3


               Magnesium Level                              2.0


               Total Bilirubin                              0.2


               Direct Bilirubin                            0.00


               Indirect Bilirubin                           0.2


               Aspartate Amino Transf
(AST/SGOT)            18  



               Alanine Aminotransferase
(ALT/SGPT)          60  



               Alkaline Phosphatase                         118


               Total Protein                               6.0  L


               Albumin                                     2.8  L


               Globulin                                    3.20


               Albumin/Globulin Ratio                      0.87


               Hepatitis B Surface Antigen          NEGATIVE


               Hepatitis B Surface Antibody         NEGATIVE


               Hepatitis B Core Total
Antibody      NEGATIVE  



               Hepatitis C Antibody                 Pending








Medications


Medication





Current Medications


IV Flush (NS 3 ml) 3 ml PER PROTOCOL IV ;  Start 6/10/19 at 20:30


Ondansetron HCl (Zofran Inj) 4 mg Q6H  PRN IV NAUSEA/VOMITING;  Start 6/10/19 at


20:30


Acetaminophen (Tylenol Tab) 650 mg Q6H  PRN PO .PAIN 1-3 OR TEMP;  Start 6/10/19


at 20:30


Morphine Sulfate (morphine) 2 mg Q4H  PRN IV .SEVERE PAIN 7-10 Last administered


on 6/13/19at 16:13; Admin Dose 2 MG;  Start 6/10/19 at 20:30


Docusate Sodium (Colace) 100 mg Q12H  PRN PO .CONSTIPATION;  Start 6/10/19 at 


20:30


Bisacodyl (Dulcolax) 5 mg DAILY  PRN PO .CONSTIPATION;  Start 6/10/19 at 20:30


Vancomycin HCl (Vanco Iv Per Pharmacy) VANCOMYCIN PER PHARMACY PER PROTOCOL XX ;


 Start 6/10/19 at 20:30


Piperacillin Sod/ Tazobactam Sod 100 ml @  200 mls/hr Q6 IVPB  Last administered


on 6/13/19at 12:16; Admin Dose 200 MLS/HR;  Start 6/11/19 at 00:00


Vancomycin HCl 250 ml @  125 mls/hr Q8H IVPB  Last administered on 6/13/19at 


14:15; Admin Dose 125 MLS/HR;  Start 6/11/19 at 05:00


Sodium Chloride 1,000 ml @  70 mls/hr [id number] IV  Last administered on 6/13/19at


00:18; Admin Dose 70 MLS/HR;  Start 6/11/19 at 06:00


Sodium Hypochlorite (Dakins Diluted (1/40)) 1 applic DAILY TP  Last administered


on 6/12/19at 15:56; Admin Dose 1 APPLIC;  Start 6/12/19 at 14:00


Miscellaneous Information (*Rx Drug Level Order Reminder*) VANCO TROUGH ON 


06/1... 2100  ONCE XX ;  Start 6/13/19 at 21:00;  Stop 6/13/19 at 21:01


Methadone HCl (Methadone Liq) 30 mg AM PO ;  Start 6/14/19 at 09:00











SOUMYA SALGADO MD         Jun 13, 2019 17:50

## 2019-06-13 NOTE — CONS
DATE OF ADMISSION: 06/10/2019

DATE OF CONSULTATION:  06/13/2019

 

 

 

TYPE OF CONSULTATION:  Infectious disease.

 

REASON FOR CONSULTATION:  Antibiotic management.

 

HISTORY OF PRESENT ILLNESS:  Jim Pugh is a 38-year-old male heroin abuser, who comes in with i
nfected left hip and is being seen for antibiotic management.  The patient was admitted last week wit
h a large volleyball size abscess on his left flank, which was drained by Dr. Magdaleno.  He also had an
 abscess on the left buttock, but he decided to leave against medical advice because of anxiety.  He 
had incision and drainage of left gluteal abscess.  Since discharge, he has continued to use heroin, 
although he does not injected to his left side.

 

PAST PROBLEMS INCLUDE:

1.  Heroin abuse.

2.  Recent left gluteal abscess, status post OR incision and drainage last week.  The patient also ha
d left hand surgery.

 

OTHER MEDICAL PROBLEMS:  Include anxiety and depression.  He has been using heroin intermittently for
 the last 27 years.

 

FAMILY HISTORY:  Noncontributory.

 

SOCIAL HISTORY:  He quit alcohol 15 years ago.  He is a current every day smoker and he uses heroin e
ventually.

 

ANCILLARY LABORATORY DATA:  On admission, his white count was 20.8, H and H of 10.7 and 32.9, platele
t count 446,000.  BUN and creatinine 18/0.86, glucose of 75.  The patient was started on vancomycin, 
Zosyn, and also for some reason ceftriaxone.

 

HOSPITAL COURSE:  The patient is being followed by hospitalist, Dr. Sunil Carbajal, saw him on the 11
th and he did an incision and drainage of a large left gluteal abscess 8 x 8 cm local anesthetic inje
ction with multiloculated.  I believe it was done at the bedside.  Finger dissection to break up, alt
neena a multiloculations.  Wound was irrigated with Betadine and saline and packed with Kerlix and Be
tadine.  Currently, status post incision and drainage of the left gluteal abscess.  Leukocytosis is r
esolved.

 

PHYSICAL EXAMINATION:

GENERAL:  He is alert and oriented, in no acute distress.

VITAL SIGNS:  Stable.  He is afebrile.

SKIN:  Without generalized rash.

HEENT:  Within normal limits.

NECK:  Supple.

LYMPH NODES:  None palpable.

CHEST:  Decreased breath sounds at the bases.

HEART:  Without murmur or gallop.

ABDOMEN:  Soft, nontender, without organosplenomegaly or masses.

EXTREMITIES:  Without cyanosis or clubbing.  His left buttock is packed.  There is less edema.  No ov
ert bleeding.  Minimal drainage.

 

The patient's white count today is 7.5 and he is currently on vancomycin and Zosyn.  His blood cultur
es so far negative.  Wound cultures are preliminary, show 1+ epithelial cells, no organisms seen.  We
 will continue him on his current regimen.  I will dictate my findings to the hospitalist and to Dr. Sunil Carbajal.

 

 

Dictated By: JERROLD DREYER MD

 

JINA/NTS

DD:    06/13/2019 17:04:01

DT:    06/13/2019 17:25:28

Conf#: [id number]

DID#:  [id number]

CC: SUNIL CARBAJAL MD; JAM VINSON MD;*[facility]*

## 2019-06-14 VITALS — HEART RATE: 70 BPM | DIASTOLIC BLOOD PRESSURE: 58 MMHG | SYSTOLIC BLOOD PRESSURE: 104 MMHG | RESPIRATION RATE: 16 BRPM

## 2019-06-14 VITALS — HEART RATE: 79 BPM | DIASTOLIC BLOOD PRESSURE: 54 MMHG | RESPIRATION RATE: 20 BRPM | SYSTOLIC BLOOD PRESSURE: 98 MMHG

## 2019-06-14 VITALS — RESPIRATION RATE: 18 BRPM | HEART RATE: 70 BPM | DIASTOLIC BLOOD PRESSURE: 46 MMHG | SYSTOLIC BLOOD PRESSURE: 94 MMHG

## 2019-06-14 VITALS — RESPIRATION RATE: 20 BRPM | SYSTOLIC BLOOD PRESSURE: 100 MMHG | DIASTOLIC BLOOD PRESSURE: 55 MMHG | HEART RATE: 94 BPM

## 2019-06-14 LAB
ADD MAN DIFF?: NO
ALANINE AMINOTRANSFERASE: 54 IU/L (ref 13–69)
ALBUMIN/GLOBULIN RATIO: 0.82
ALBUMIN: 2.9 G/DL (ref 3.3–4.9)
ALKALINE PHOSPHATASE: 98 IU/L (ref 42–121)
ANION GAP: 3 (ref 5–13)
ASPARTATE AMINO TRANSFERASE: 26 IU/L (ref 15–46)
BASOPHIL #: 0.1 10^3/UL (ref 0–0.1)
BASOPHILS %: 0.6 % (ref 0–2)
BILIRUBIN,DIRECT: 0 MG/DL (ref 0–0.2)
BILIRUBIN,TOTAL: 0.2 MG/DL (ref 0.2–1.3)
BLOOD UREA NITROGEN: 12 MG/DL (ref 7–20)
CALCIUM: 8.6 MG/DL (ref 8.4–10.2)
CARBON DIOXIDE: 32 MMOL/L (ref 21–31)
CHLORIDE: 104 MMOL/L (ref 97–110)
CREATININE: 0.88 MG/DL (ref 0.61–1.24)
EOSINOPHILS #: 0.3 10^3/UL (ref 0–0.5)
EOSINOPHILS %: 3 % (ref 0–7)
GLOBULIN: 3.5 G/DL (ref 1.3–3.2)
GLUCOSE: 91 MG/DL (ref 70–220)
HEMATOCRIT: 27 % (ref 42–52)
HEMOGLOBIN: 8.6 G/DL (ref 14–18)
IMMATURE GRANS #M: 0.12 10^3/UL (ref 0–0.03)
IMMATURE GRANS % (M): 1.4 % (ref 0–0.43)
LYMPHOCYTES #: 2.2 10^3/UL (ref 0.8–2.9)
LYMPHOCYTES %: 25.8 % (ref 15–51)
MEAN CORPUSCULAR HEMOGLOBIN: 30.2 PG (ref 29–33)
MEAN CORPUSCULAR HGB CONC: 31.9 G/DL (ref 32–37)
MEAN CORPUSCULAR VOLUME: 94.7 FL (ref 82–101)
MEAN PLATELET VOLUME: 8.2 FL (ref 7.4–10.4)
MONOCYTE #: 0.9 10^3/UL (ref 0.3–0.9)
MONOCYTES %: 11.2 % (ref 0–11)
NEUTROPHIL #: 4.8 10^3/UL (ref 1.6–7.5)
NEUTROPHILS %: 58 % (ref 39–77)
NUCLEATED RED BLOOD CELLS #: 0 10^3/UL (ref 0–0)
NUCLEATED RED BLOOD CELLS%: 0 /100WBC (ref 0–0)
PLATELET COUNT: 560 10^3/UL (ref 140–415)
POTASSIUM: 4.3 MMOL/L (ref 3.5–5.1)
RED BLOOD COUNT: 2.85 10^6/UL (ref 4.7–6.1)
RED CELL DISTRIBUTION WIDTH: 13.1 % (ref 11.5–14.5)
SODIUM: 139 MMOL/L (ref 135–144)
TOTAL PROTEIN: 6.4 G/DL (ref 6.1–8.1)
WHITE BLOOD COUNT: 8.3 10^3/UL (ref 4.8–10.8)

## 2019-06-14 RX ADMIN — MICONAZOLE NITRATE 1 APPLIC: 20 CREAM TOPICAL at 20:10

## 2019-06-14 RX ADMIN — SULFAMETHOXAZOLE AND TRIMETHOPRIM SCH TAB: 800; 160 TABLET ORAL at 20:09

## 2019-06-14 RX ADMIN — PIPERACILLIN SODIUM AND TAZOBACTAM SODIUM 1 MLS/HR: 3; .375 INJECTION, POWDER, LYOPHILIZED, FOR SOLUTION INTRAVENOUS at 12:58

## 2019-06-14 RX ADMIN — PIPERACILLIN SODIUM AND TAZOBACTAM SODIUM SCH MLS/HR: 3; .375 INJECTION, POWDER, LYOPHILIZED, FOR SOLUTION INTRAVENOUS at 00:31

## 2019-06-14 RX ADMIN — METHADONE HYDROCHLORIDE SCH MG: 5 SOLUTION ORAL at 09:18

## 2019-06-14 RX ADMIN — VANCOMYCIN HYDROCHLORIDE 1 MLS/HR: 1 INJECTION, POWDER, LYOPHILIZED, FOR SOLUTION INTRAVENOUS at 06:04

## 2019-06-14 RX ADMIN — METHADONE HYDROCHLORIDE 1 MG: 5 SOLUTION ORAL at 09:18

## 2019-06-14 RX ADMIN — PIPERACILLIN SODIUM AND TAZOBACTAM SODIUM 1 MLS/HR: 3; .375 INJECTION, POWDER, LYOPHILIZED, FOR SOLUTION INTRAVENOUS at 05:18

## 2019-06-14 RX ADMIN — MICONAZOLE NITRATE 1 APPLIC: 20 CREAM TOPICAL at 08:50

## 2019-06-14 RX ADMIN — SULFAMETHOXAZOLE AND TRIMETHOPRIM 1 TAB: 800; 160 TABLET ORAL at 20:09

## 2019-06-14 RX ADMIN — MICONAZOLE NITRATE SCH APPLIC: 20 CREAM TOPICAL at 08:50

## 2019-06-14 RX ADMIN — MICONAZOLE NITRATE SCH APPLIC: 20 CREAM TOPICAL at 20:10

## 2019-06-14 RX ADMIN — VANCOMYCIN HYDROCHLORIDE 1 MLS/HR: 750 INJECTION, POWDER, LYOPHILIZED, FOR SOLUTION INTRAVENOUS at 13:43

## 2019-06-14 RX ADMIN — FOLIC ACID SCH MLS/HR: 5 INJECTION, SOLUTION INTRAMUSCULAR; INTRAVENOUS; SUBCUTANEOUS at 02:47

## 2019-06-14 RX ADMIN — SODIUM HYPOCHLORITE 1 APPLIC: 0.12 SOLUTION TOPICAL at 09:00

## 2019-06-14 RX ADMIN — PIPERACILLIN SODIUM AND TAZOBACTAM SODIUM 1 MLS/HR: 3; .375 INJECTION, POWDER, LYOPHILIZED, FOR SOLUTION INTRAVENOUS at 00:31

## 2019-06-14 RX ADMIN — THIAMINE HYDROCHLORIDE 1 MLS/HR: 100 INJECTION, SOLUTION INTRAMUSCULAR; INTRAVENOUS at 02:47

## 2019-06-14 RX ADMIN — PIPERACILLIN SODIUM AND TAZOBACTAM SODIUM SCH MLS/HR: 3; .375 INJECTION, POWDER, LYOPHILIZED, FOR SOLUTION INTRAVENOUS at 12:58

## 2019-06-14 RX ADMIN — VANCOMYCIN HYDROCHLORIDE SCH MLS/HR: 1 INJECTION, POWDER, LYOPHILIZED, FOR SOLUTION INTRAVENOUS at 06:04

## 2019-06-14 RX ADMIN — PIPERACILLIN SODIUM AND TAZOBACTAM SODIUM SCH MLS/HR: 3; .375 INJECTION, POWDER, LYOPHILIZED, FOR SOLUTION INTRAVENOUS at 05:18

## 2019-06-14 NOTE — PN
Date/Time of Note


Date/Time of Note


DATE: 6/14/19 


TIME: 14:07





Assessment/Plan


Lines/Catheters


IV Catheter Type (from Nrs):  Peripheral IV


Kaye in Place (from Nrs):  No





Assessment/Plan


Chief Complaint/Hosp Course


1.  Left gluteal abscess: Status post I&D 6/3/2019; I&D of left buttock 6/11/19


-local care w dakins> had lengthy discussion with patient regarding importance 


of wound packing 


-antibiotics per sensi's


-Pain management


-DC okay from surgical standpoint with wound care


2.  Leukocytosis: Resolved


-As above


-Trend


3.Hyperkalemia:


-Optimize electrolytes


4.  Elevated ALT and alkaline phosphatase: Improving; hepatitis panel negative 


thus far


-Trend


-Further work-up if persistent


5.  Normocytic normochromic anemia:


-Monitor and transfuse as needed


6.  Asthma history:


-Med management


7.  Illicit drug use:


-Highly encourage cessation





Thank you.  Patient seen and examined in collaboration with Dr. Chriss Carbajal.





Subjective


24 Hr Interval Summary


Intermittently refusing wound packing.  Once again explained importance of wound


packing.  Left buttock pain much improved.  No fevers, chills, sob, congested 


cough, cp, palpitations, ha, dizziness, nausea, vomiting, diarrhea, dysuria.





Exam/Review of Systems


Vital Signs


Vitals





Vital Signs


  Date      Temp  Pulse  Resp  B/P (MAP)   Pulse Ox  O2          O2 Flow    FiO2


Time                                                 Delivery    Rate


   6/14/19  98.3     70    16      104/58       100  Room Air


     08:52                           (73)








Intake and Output





6/13/19 6/13/19 6/14/19





1515:00


23:00


07:00





IntakeIntake Total


1070 ml


950 ml


1926 ml





OutputOutput Total


900 ml





BalanceBalance


1070 ml


950 ml


1026 ml














Exam


Free Text/Dictation


Constitutional:  alert, oriented


Psych:  nl mood/affect, anxiety


Head:  normocephalic, atraumatic


Eyes:  nl conjunctiva, EOMI, nl lids, nl sclera


ENMT:  nl external ears & nose, nl lips & teeth, nl nasal mucosa & septum, 


mucosa pink and moist


Neck:  supple, non-tender


Respiratory:  normal air movement; 


   No congested cough


Cardiovascular:  regular rate and rhythm, nl pulses


Gastrointestinal:  soft, non-tender


Musculoskeletal:  nl extremities to inspection, nl gait and stance


Extremities:  normal pulses


Neurological:  nl mental status, nl speech, nl strength


Skin:  nl turgor, other (Left buttock: Packed, much improved edema, no overt 


bleeding| left hip: Open wounds: Min drainage)





Results


Result Diagram:  


6/13/19 0708                                                                    


           6/13/19 0708














CHOLO KIRKPATRICK NP       Jun 14, 2019 14:08

## 2019-06-14 NOTE — CONS
Assessment/Plan


Assessment/Plan


Hospital Course (Demo Recall)


Patient is alert feels good denies pain no fevers overnight he is on Vanco 


Zosyn.  Previous wound culture grew oxacillin sensitive staph aureus.  WBC 7.5 


yesterday no labs today wound culture pending





Physical examination well-developed middle-aged man in no distress head 


atraumatic normocephalic neck is supple chest rise symmetrical breath sounds 


clear heart S1-S2 abdomen soft bowel sounds present extremities without cyanosis





Assessment:


1.  Recurrent left gluteal abscess, status post I&D


2.  Illicit drug use





Plan: Change antibiotics to oral Bactrim





Consultation Date/Type/Reason


Admit Date/Time


Jose 10, 2019 at 19:31


Initial Consult Date


6/11/19


Type of Consult


ID


Date/Time of Note


DATE: 6/14/19 


TIME: 14:50





Exam/Review of Systems


Exam


Vitals





Vital Signs


  Date      Temp  Pulse  Resp  B/P (MAP)   Pulse Ox  O2          O2 Flow    FiO2


Time                                                 Delivery    Rate


   6/14/19  98.3     70    16      104/58       100  Room Air


     08:52                           (73)








Intake and Output





6/13/19 6/13/19 6/14/19





1515:00


23:00


07:00





IntakeIntake Total


1070 ml


950 ml


1926 ml





OutputOutput Total


900 ml





BalanceBalance


1070 ml


950 ml


1026 ml














Results


Result Diagram:  


6/13/19 0708                                                                    


           6/13/19 0708





Results 24hrs





Laboratory Tests


                     Test
                    6/13/19
20:57


                     Vancomycin Level Trough         17.2








Medications


Medication





Current Medications


IV Flush (NS 3 ml) 3 ml PER PROTOCOL IV ;  Start 6/10/19 at 20:30


Ondansetron HCl (Zofran Inj) 4 mg Q6H  PRN IV NAUSEA/VOMITING;  Start 6/10/19 at


20:30


Acetaminophen (Tylenol Tab) 650 mg Q6H  PRN PO .PAIN 1-3 OR TEMP;  Start 6/10/19


at 20:30


Morphine Sulfate (morphine) 2 mg Q4H  PRN IV .SEVERE PAIN 7-10 Last administered


on 6/13/19at 16:13; Admin Dose 2 MG;  Start 6/10/19 at 20:30;  Status Hold


Docusate Sodium (Colace) 100 mg Q12H  PRN PO .CONSTIPATION;  Start 6/10/19 at 


20:30


Bisacodyl (Dulcolax) 5 mg DAILY  PRN PO .CONSTIPATION;  Start 6/10/19 at 20:30


Vancomycin HCl (Vanco Iv Per Pharmacy) VANCOMYCIN PER PHARMACY PER PROTOCOL XX ;


 Start 6/10/19 at 20:30


Piperacillin Sod/ Tazobactam Sod 100 ml @  200 mls/hr Q6 IVPB  Last administered


on 6/14/19at 12:58; Admin Dose 200 MLS/HR;  Start 6/11/19 at 00:00


Sodium Hypochlorite (Dakins Diluted (1/40)) 1 applic DAILY TP  Last administered


on 6/12/19at 15:56; Admin Dose 1 APPLIC;  Start 6/12/19 at 14:00


Methadone HCl (Methadone Liq) 30 mg AM PO  Last administered on 6/14/19at 09:18;


Admin Dose 30 MG;  Start 6/14/19 at 09:00


Miconazole Nitrate (Miconazole 2% Cr) 1 applic BID TOP  Last administered on 


6/14/19at 08:50; Admin Dose 1 APPLIC;  Start 6/13/19 at 19:30;  Stop 6/27/19 at 


23:00


Lorazepam (Ativan) 1 mg Q4H  PRN IV AGITATION/ANXIETY;  Start 6/13/19 at 23:00


Morphine Sulfate (morphine) 1 mg Q6H  PRN IV SEVERE PAIN LEVEL 7-10 Last 


administered on 6/13/19at 23:07; Admin Dose 1 MG;  Start 6/13/19 at 23:00


Vancomycin/Sodium Chloride 250 ml @  125 mls/hr Q8H IVPB  Last administered on 


6/14/19at 13:43; Admin Dose 125 MLS/HR;  Start 6/14/19 at 13:00


Miscellaneous Information (*Rx Drug Level Order Reminder*) VANCO TROUGH 6/ 15 @ 


1,200 1200  ONCE XX ;  Start 6/15/19 at 12:00;  Stop 6/15/19 at 12:01











JOSE CARLOS NP            Jun 14, 2019 14:50

## 2019-06-15 VITALS — RESPIRATION RATE: 20 BRPM | DIASTOLIC BLOOD PRESSURE: 57 MMHG | SYSTOLIC BLOOD PRESSURE: 113 MMHG | HEART RATE: 78 BPM

## 2019-06-15 VITALS — DIASTOLIC BLOOD PRESSURE: 63 MMHG | HEART RATE: 89 BPM | RESPIRATION RATE: 20 BRPM | SYSTOLIC BLOOD PRESSURE: 131 MMHG

## 2019-06-15 VITALS — DIASTOLIC BLOOD PRESSURE: 61 MMHG | SYSTOLIC BLOOD PRESSURE: 106 MMHG | RESPIRATION RATE: 18 BRPM | HEART RATE: 82 BPM

## 2019-06-15 LAB
% IRON SATURATION: 20 % SAT (ref 22–52)
ADD MAN DIFF?: NO
BASOPHIL #: 0.1 10^3/UL (ref 0–0.1)
BASOPHILS %: 0.6 % (ref 0–2)
EOSINOPHILS #: 0.2 10^3/UL (ref 0–0.5)
EOSINOPHILS %: 2.5 % (ref 0–7)
FERRITIN: 141 NG/ML (ref 17.9–464)
HEMATOCRIT: 32.7 % (ref 42–52)
HEMOGLOBIN: 10.5 G/DL (ref 14–18)
IMMATURE GRANS #M: 0.11 10^3/UL (ref 0–0.03)
IMMATURE GRANS % (M): 1.3 % (ref 0–0.43)
IRON: 44 UG/DL (ref 35–150)
LYMPHOCYTES #: 2 10^3/UL (ref 0.8–2.9)
LYMPHOCYTES %: 23.3 % (ref 15–51)
MEAN CORPUSCULAR HEMOGLOBIN: 29.9 PG (ref 29–33)
MEAN CORPUSCULAR HGB CONC: 32.1 G/DL (ref 32–37)
MEAN CORPUSCULAR VOLUME: 93.2 FL (ref 82–101)
MEAN PLATELET VOLUME: 7.9 FL (ref 7.4–10.4)
MONOCYTE #: 1.1 10^3/UL (ref 0.3–0.9)
MONOCYTES %: 12.3 % (ref 0–11)
NEUTROPHIL #: 5.1 10^3/UL (ref 1.6–7.5)
NEUTROPHILS %: 60 % (ref 39–77)
NUCLEATED RED BLOOD CELLS #: 0 10^3/UL (ref 0–0)
NUCLEATED RED BLOOD CELLS%: 0 /100WBC (ref 0–0)
PLATELET COUNT: 491 10^3/UL (ref 140–415)
RED BLOOD COUNT: 3.51 10^6/UL (ref 4.7–6.1)
RED CELL DISTRIBUTION WIDTH: 12.9 % (ref 11.5–14.5)
TOTAL IRON BINDING CAPACITY: 222 UG/DL (ref 241–421)
WHITE BLOOD COUNT: 8.5 10^3/UL (ref 4.8–10.8)

## 2019-06-15 RX ADMIN — SULFAMETHOXAZOLE AND TRIMETHOPRIM 1 TAB: 800; 160 TABLET ORAL at 09:11

## 2019-06-15 RX ADMIN — MICONAZOLE NITRATE SCH APPLIC: 20 CREAM TOPICAL at 11:32

## 2019-06-15 RX ADMIN — SODIUM HYPOCHLORITE 1 APPLIC: 0.12 SOLUTION TOPICAL at 11:31

## 2019-06-15 RX ADMIN — MICONAZOLE NITRATE 1 APPLIC: 20 CREAM TOPICAL at 11:32

## 2019-06-15 RX ADMIN — SULFAMETHOXAZOLE AND TRIMETHOPRIM SCH TAB: 800; 160 TABLET ORAL at 09:11

## 2019-06-15 RX ADMIN — METHADONE HYDROCHLORIDE SCH MG: 5 SOLUTION ORAL at 09:10

## 2019-06-15 RX ADMIN — METHADONE HYDROCHLORIDE 1 MG: 5 SOLUTION ORAL at 09:10

## 2019-06-15 NOTE — PDOCDIS
Discharge Instructions


DIAGNOSIS


Discharge Diagnosis


Gluteal abscess; chemical dependency; anemia





CONDITION


                 [id number]
Patient Condition:  [id number]
Fair








HOME CARE INSTRUCTIONS:


                [id number]
Diet Instructions:  [id number]
Regular








ACTIVITY:


     [id number]
Activity Restrictions:  [id number]
Slowly Increase Activity








FOLLOW UP/APPOINTMENTS


Follow-up Plan


Follow-up with general surgeon within 7 days; follow-up with methadone clinic as


soon as possible; follow-up with primary care physician within 1 week











GEORGES MCHUGH MD              Jose 15, 2019 15:11

## 2019-06-15 NOTE — CONS
Consultation Date/Type/Reason


Admit Date/Time


Jose 10, 2019 at 19:31


Initial Consult Date


6/11/19


Type of Consult


SUBJECTIVE: Patient is awake, alert, resting in bed. No fevers.  





VS: stable T: 98.6


LABS: Reviewed. WBC- 8.5





Antibacterials: PO Bactrim DS


MICROBIOLOGY:  Previous wound culture grew oxacillin sensitive staph aureus. 





Physical Exam:


GEN:  well-developed middle-aged man in no distress 


HENT: head atraumatic normocephalic; neck is supple 


PULM: chest rise symmetrical breath sounds clear 


Heart: S1-S2 


Abdomen: soft, bowel sounds present 


Extremities without cyanosis





Assessment:


1.  Recurrent left gluteal abscess, status post I&D


2.  Illicit drug use





Plan: Pt is clinically stable. Continue PO Bactrim and daily wound care.


Date/Time of Note


DATE: 6/15/19 


TIME: 15:06





Exam/Review of Systems


Exam


Vitals





Vital Signs


  Date      Temp  Pulse  Resp  B/P (MAP)   Pulse Ox  O2          O2 Flow    FiO2


Time                                                 Delivery    Rate


   6/15/19  98.6     78    20      113/57       100  Room Air


     08:36                           (75)








Intake and Output





6/14/19


6/14/19


6/15/19





1515:00


23:00


07:00





IntakeIntake Total


1290 ml


250 ml


450 ml





OutputOutput Total


900 ml





BalanceBalance


1290 ml


250 ml


-450 ml














Results


Result Diagram:  


6/15/19 0710                                                                    


           6/14/19 1559





Results 24hrs





Laboratory Tests


Test
                                6/14/19
15:59  6/15/19
07:07  6/15/19
07:10


White Blood Count                            8.3                           8.5


Red Blood Count                            2.85  L                      3.51  #L


Hemoglobin                                  8.6  L                      10.5  #L


Hematocrit                                 27.0  L                      32.7  #L


Mean Corpuscular Volume                     94.7                          93.2


Mean Corpuscular Hemoglobin                 30.2                          29.9


Mean Corpuscular Hemoglobin
Concent       31.9  L
  
                    32.1  



Red Cell Distribution Width                 13.1                          12.9


Platelet Count                             560  #H                        491  H


Mean Platelet Volume                         8.2                           7.9


Immature Granulocytes %                   1.400  H                      1.300  H


Neutrophils %                               58.0                          60.0


Lymphocytes %                               25.8                          23.3


Monocytes %                                11.2  H                       12.3  H


Eosinophils %                                3.0                           2.5


Basophils %                                  0.6                           0.6


Nucleated Red Blood Cells %                  0.0                           0.0


Immature Granulocytes #                   0.120  H                      0.110  H


Neutrophils #                                4.8                           5.1


Lymphocytes #                                2.2                           2.0


Monocytes #                                  0.9                          1.1  H


Eosinophils #                                0.3                           0.2


Basophils #                                  0.1                           0.1


Nucleated Red Blood Cells #                  0.0                           0.0


Sodium Level                                 139


Potassium Level                              4.3


Chloride Level                               104


Carbon Dioxide Level                         32  H


Anion Gap                                     3  L


Blood Urea Nitrogen                           12


Creatinine                                  0.88


Est Glomerular Filtrat Rate
mL/min   > 60  
        
              



Glucose Level                                 91


Calcium Level                                8.6


Total Bilirubin                              0.2


Direct Bilirubin                            0.00


Indirect Bilirubin                           0.2


Aspartate Amino Transf
(AST/SGOT)            26  
  
              



Alanine Aminotransferase
(ALT/SGPT)          54  
  
              



Alkaline Phosphatase                          98


Total Protein                                6.4


Albumin                                     2.9  L


Globulin                                   3.50  H


Albumin/Globulin Ratio                      0.82


Iron Level                                                   44


Total Iron Binding Capacity                                222  L


Percent Iron Saturation                                     20  L


Ferritin                                                  141.0








Medications


Medication





Current Medications


IV Flush (NS 3 ml) 3 ml PER PROTOCOL IV ;  Start 6/10/19 at 20:30


Ondansetron HCl (Zofran Inj) 4 mg Q6H  PRN IV NAUSEA/VOMITING;  Start 6/10/19 at


20:30


Acetaminophen (Tylenol Tab) 650 mg Q6H  PRN PO .PAIN 1-3 OR TEMP;  Start 6/10/19


at 20:30


Morphine Sulfate (morphine) 2 mg Q4H  PRN IV .SEVERE PAIN 7-10 Last administered


on 6/13/19at 16:13; Admin Dose 2 MG;  Start 6/10/19 at 20:30;  Status Hold


Docusate Sodium (Colace) 100 mg Q12H  PRN PO .CONSTIPATION;  Start 6/10/19 at 


20:30


Bisacodyl (Dulcolax) 5 mg DAILY  PRN PO .CONSTIPATION;  Start 6/10/19 at 20:30


Sodium Hypochlorite (Dakins Diluted (1/40)) 1 applic DAILY TP  Last administered


on 6/15/19at 11:31; Admin Dose 1 APPLIC;  Start 6/12/19 at 14:00


Methadone HCl (Methadone Liq) 30 mg AM PO  Last administered on 6/15/19at 09:10;


Admin Dose 30 MG;  Start 6/14/19 at 09:00


Miconazole Nitrate (Miconazole 2% Cr) 1 applic BID TOP  Last administered on 


6/15/19at 11:32; Admin Dose 1 APPLIC;  Start 6/13/19 at 19:30;  Stop 6/27/19 at 


23:00


Lorazepam (Ativan) 1 mg Q4H  PRN IV AGITATION/ANXIETY;  Start 6/13/19 at 23:00


Morphine Sulfate (morphine) 1 mg Q6H  PRN IV SEVERE PAIN LEVEL 7-10 Last 


administered on 6/13/19at 23:07; Admin Dose 1 MG;  Start 6/13/19 at 23:00


Trimethoprim/ Sulfamethoxazole (Bactrim (Ds)) 1 tab BID PO  Last administered on


6/15/19at 09:11; Admin Dose 1 TAB;  Start 6/14/19 at 21:00











IKE GORDON              Jose 15, 2019 15:12

## 2019-06-15 NOTE — DS
Date/Time of Note


Date/Time of Note


DATE: 6/15/19 


TIME: 15:13





Discharge Summary


Admission/Discharge Info


Admit Date/Time


Jose 10, 2019 at 19:31


Discharge Date/Time





 Shayy 15, 2019


Discharge Diagnosis


Gluteal abscess; chemical dependency; anemia


Patient Condition:  Fair


Consults


General surgery-Dr. Chriss Carbajal


Procedures


I&D of gluteal abscess


Hx of Present Illness


HPI


This is a 38-year-old heroin drug abuser presenting with continued left buttock 


pain, redness, swelling.  He states he left the hospital AGAINST MEDICAL ADVICE 


about a week ago shortly after OR incisional drainage of his left gluteal 


abscess.  He states since discharge he has continued to use heroin although has 


not injected his left side.  He denies fevers or chills, no chest pain or 


shortness of breath.








Hx of Present Illness


Chief complaint: Left buttock pain, swelling





This is a 38-year-old heroin drug abuser presenting with continued left buttock 


pain, redness, swelling.  She was admitted for left gluteal abscess which he had


OR drainage for by Dr. Magdaleno.  Patient was also on antibiotics.  He left the 


hospital AGAINST MEDICAL ADVICE.  He states that over the last few days he has 


started noticing increased swelling of the left buttocks and tightness.  He 


denies any fevers.  He is currently not on any antibiotics.  He denies any 


fevers or chills or nausea vomiting or diarrhea.  He did report that he uses 


heroin but that he did not inject in the left gluteal area.  He did have a wound


culture during his previous admission that grew staph aureus.





Hx of Present Illness


Jim Pugh is a 38-year-old man with past medical history of illicit drug 


use, who presents with left gluteal pain and swelling.  Notably, he was admitted


a few days ago for similar issues and underwent left gluteal abscess incision 


and drainage.  He again returns with increased swelling to the left gluteal 


area.  He denies injecting into the area however he does admit to injecting 


heroin intravenously in his arm. Laboratory findings significant for 


leukocytosis.  Imaging of the left gluteal area shows heterogeneous collection 


in subcutaneous tissues measuring 7.8 x 3.7 x 8.4 cm.  Additional symptoms 


include chills and left buttock pain.  He denies fevers, chills, congested 


cough, additional trauma to the area, change in bowel or bladder habits, skin 


changes, seizure, rash.  General surgery was asked to evaluate.


12 point review of systems was performed and is negative except as stated in 


HPI.


Hospital Course


38-year-old   male who is homeless and has chemical dependency 


using heroin.  He developed a gluteal abscess which required incision and 


drainage.  He has improved and is now stable for discharge.  Please note his 


discharge was not done yesterday and is now being done today.  We will try and 


get him over to the methadone clinic to get him set up and then he will be 


following up with his surgeon Dr. Carbajal.


Home Meds


Active Scripts


Sulfamethoxazole/Trimethoprim (Sulfamethoxazole-Tmp Ds Tablet) 1 Each Tablet, 1 


TAB PO BID for 7 Days, #14 TAB


   Prov:GEORGES MCHUGH MD         6/15/19


Amoxicillin* (Amoxicillin*) 500 Mg Cap, 500 MG PO BID for 7 Days, CAP


   Prov:ARNAV ROTHMAN         2/28/18


Ibuprofen* (Motrin*) 600 Mg Tab, 600 MG PO Q6, #30 TAB


   Prov:ARNAV ROTHMAN         2/28/18


Follow-up Plan


Follow-up with general surgeon, Dr. Chriss Carbajal within 7 days; follow-up with


methadone clinic as soon as possible; follow-up with primary care physician 


within 1 week


Primary Care Provider


Care Physician No Primary


Time spent on discharge:   > 30 minutes


Pending Labs





Laboratory Tests


Test
                      6/14/19
15:59       6/15/19
07:07       6/15/19
07:10


White Blood Count
                   8.3  
                                  8.5


                      10^3/ul
(4.8-10.8)                      10^3/ul
(4.8-10.8)


Red Blood Count
                    2.85  
                                 3.51


                     10^6/ul
(4.70-6.10)                      10^6/ul
(4.70-6.10


                                                                               )


Hemoglobin
                          8.6  
                                 10.5


                        g/dl
(14.0-18.0)                        g/dl
(14.0-18.0)


Hematocrit
           27.0 %
(42.0-52.0)  
                   32.7 %
(42.0-52.0)


Mean Corpuscular                    94.7  
                                 93.2


Volume
                  fl
(82.0-101.0)                         fl
(82.0-101.0)


Mean Corpuscular     30.2 pg
(29.0-33.0)  
                                 29.9


Hemoglobin
                                                       pg
(29.0-33.0)


Mean Corpuscular                    31.9  
                                 32.1


Hemoglobin
Concent      g/dl
(32.0-37.0)                        g/dl
(32.0-37.0)


Red Cell              13.1 %
(11.5-14.5)  
                   12.9 %
(11.5-14.5)


Distribution Width



Platelet Count
                      560  
                                  491


                       10^3/UL
(140-415)                       10^3/UL
(140-415)


Mean Platelet          8.2 fl
(7.4-10.4)  
                    7.9 fl
(7.4-10.4)


Volume



Immature                           1.400  
                                1.300


Granulocytes %
          %
(0.001-0.429)                         %
(0.001-0.429)


Neutrophils %
        58.0 %
(39.0-77.0)  
                   60.0 %
(39.0-77.0)


Lymphocytes %
        25.8 %
(15.0-51.0)  
                   23.3 %
(15.0-51.0)


Monocytes %
           11.2 %
(0.0-11.0)  
                    12.3 %
(0.0-11.0)


Eosinophils %
           3.0 %
(0.0-7.0)  
                      2.5 %
(0.0-7.0)


Basophils %
             0.6 %
(0.0-2.0)  
                      0.6 %
(0.0-2.0)


Nucleated Red Blood                  0.0  
                                  0.0


Cells %
               /100WBC
(0.0-0.0)                       /100WBC
(0.0-0.0)


Immature                           0.120  
                                0.110


Granulocytes #
      10^3/ul
(0.0-0.031)                      10^3/ul
(0.0-0.031


                                                                               )


Neutrophils #
                       4.8  
                                  5.1


                       10^3/ul
(1.6-7.5)                       10^3/ul
(1.6-7.5)


Lymphocytes #
                       2.2  
                                  2.0


                       10^3/ul
(0.8-2.9)                       10^3/ul
(0.8-2.9)


Monocytes #
                         0.9  
                                  1.1


                       10^3/ul
(0.3-0.9)                       10^3/ul
(0.3-0.9)


Eosinophils #
                       0.3  
                                  0.2


                       10^3/ul
(0.0-0.5)                       10^3/ul
(0.0-0.5)


Basophils #
                         0.1  
                                  0.1


                       10^3/ul
(0.0-0.1)                       10^3/ul
(0.0-0.1)


Nucleated Red Blood                  0.0  
                                  0.0


Cells #
               10^3/ul
(0.0-0.0)                       10^3/ul
(0.0-0.0)


Sodium Level
                        139  
                   



                        mmol/L
(135-144)


Potassium Level
                     4.3  
                   



                        mmol/L
(3.5-5.1)


Chloride Level
      104 mmol/L
()  
                   



Carbon Dioxide         32 mmol/L
(21-31)  
                   



Level



Anion Gap                      3 (5-13)


Blood Urea               12 mg/dl
(7-20)  
                   



Nitrogen



Creatinine
                         0.88  
                   



                       mg/dl
(0.61-1.24)


Est Glomerular       > 60 mL/min
(>60)    
                   



Filtrat Rate
mL/min


Glucose Level
         91 mg/dl
()  
                   



Calcium Level
                       8.6  
                   



                        mg/dl
(8.4-10.2)


Total Bilirubin
     0.2 mg/dl
(0.2-1.3)  
                   



Direct Bilirubin
                   0.00  
                   



                       mg/dl
(0.00-0.20)


Indirect Bilirubin
    0.2 mg/dl
(0-1.1)  
                   



Aspartate Amino          26 IU/L
(15-46)  
                   



Transf
(AST/SGOT)


Alanine                  54 IU/L
(13-69)  
                   



Aminotransferase
(A


LT/SGPT)


Alkaline                98 IU/L
()  
                   



Phosphatase



Total Protein
        6.4 g/dl
(6.1-8.1)  
                   



Albumin
              2.9 g/dl
(3.3-4.9)  
                   



Globulin
            3.50 g/dl
(1.3-3.2)  
                   



Albumin/Globulin                   0.82


Ratio


Iron Level
          
                     44 ug/dl
()  



Total Iron Binding   
                                   222  



Capacity
                                    ug/dl
(241-421)


Percent Iron         
                      20 % SAT
(22-52)  



Saturation



Ferritin
            
                                 141.0  



                                          ng/ml
(17.9-464.0)














GEORGES MCHUGH MD              Jose 15, 2019 15:16

## 2019-06-15 NOTE — PN
Date/Time of Note


Date/Time of Note


DATE: 6/15/19 


TIME: 12:02





Assessment/Plan


VTE Prophylaxis


Risk score (from Ns)>0 risk:  2


SCD applied (from [facility]):  Yes


SCD contraindicated:  low risk/ambulating


Pharmacological prophylaxis:  heparin


Pharm contraindication:  low risk/ambulating





Lines/Catheters


IV Catheter Type (from [facility]):  Saline Lock


Urinary Cath still in place:  No





Assessment/Plan


Problems:  


(1) Abscess, gluteal, left


Status:  Acute


Comment:  Patient as expected declines recuperative care due to the distances 


involved.  He reports he will be taking care of himself and getting his supplies


from the [employer] store.  Is not got direct contact for the methadone clinic right


now.  As such I am not certain that we have a safe discharge in place.  Plan for


discharge tomorrow





(2) Heroin abuse


Status:  Acute


Comment:  Continue with methadone maintenance, strongly counseled about 


cessation





(3) Anemia


Comment:  Work-up.





Result Diagram:  


6/15/19 0710                                                                    


           6/14/19 1559





Results 24hrs





Laboratory Tests


       Test
                                6/14/19
15:59  6/15/19
07:10


       White Blood Count                            8.3            8.5


       Red Blood Count                            2.85  L       3.51  #L


       Hemoglobin                                  8.6  L       10.5  #L


       Hematocrit                                 27.0  L       32.7  #L


       Mean Corpuscular Volume                     94.7           93.2


       Mean Corpuscular Hemoglobin                 30.2           29.9


       Mean Corpuscular Hemoglobin
Concent       31.9  L
        32.1  



       Red Cell Distribution Width                 13.1           12.9


       Platelet Count                             560  #H         491  H


       Mean Platelet Volume                         8.2            7.9


       Immature Granulocytes %                   1.400  H       1.300  H


       Neutrophils %                               58.0           60.0


       Lymphocytes %                               25.8           23.3


       Monocytes %                                11.2  H        12.3  H


       Eosinophils %                                3.0            2.5


       Basophils %                                  0.6            0.6


       Nucleated Red Blood Cells %                  0.0            0.0


       Immature Granulocytes #                   0.120  H       0.110  H


       Neutrophils #                                4.8            5.1


       Lymphocytes #                                2.2            2.0


       Monocytes #                                  0.9           1.1  H


       Eosinophils #                                0.3            0.2


       Basophils #                                  0.1            0.1


       Nucleated Red Blood Cells #                  0.0            0.0


       Sodium Level                                 139


       Potassium Level                              4.3


       Chloride Level                               104


       Carbon Dioxide Level                         32  H


       Anion Gap                                     3  L


       Blood Urea Nitrogen                           12


       Creatinine                                  0.88


       Est Glomerular Filtrat Rate
mL/min   > 60  
        



       Glucose Level                                 91


       Calcium Level                                8.6


       Total Bilirubin                              0.2


       Direct Bilirubin                            0.00


       Indirect Bilirubin                           0.2


       Aspartate Amino Transf
(AST/SGOT)            26  
  



       Alanine Aminotransferase
(ALT/SGPT)          54  
  



       Alkaline Phosphatase                          98


       Total Protein                                6.4


       Albumin                                     2.9  L


       Globulin                                   3.50  H


       Albumin/Globulin Ratio                      0.82








Subjective


24 Hr Interval Summary


Free Text/Dictation


Patient reports he is doing well.  He however does not have arrangements made 


for his methadone clinic.  He declines recuperative care to the distances 


involved


Constitutional:  no complaints (No fevers chills or sweats)


Respiratory:  no complaints


Cardiovascular:  no complaints


Gastrointestinal:  no complaints





Exam/Review of Systems


Exam


Vitals





Vital Signs


  Date      Temp  Pulse  Resp  B/P (MAP)   Pulse Ox  O2          O2 Flow    FiO2


Time                                                 Delivery    Rate


   6/15/19  98.6     78    20      113/57       100  Room Air


     08:36                           (75)








Intake and Output





6/14/19


6/14/19


6/15/19





1515:00


23:00


07:00





IntakeIntake Total


1290 ml


250 ml


450 ml





OutputOutput Total


900 ml





BalanceBalance


1290 ml


250 ml


-450 ml











Constitutional:  alert, oriented


Neck:  supple, non-tender


Respiratory:  clear to auscultation, normal air movement


Cardiovascular:  regular rate and rhythm, nl pulses





Results


Results 24hrs





Laboratory Tests


       Test
                                6/14/19
15:59  6/15/19
07:10


       White Blood Count                            8.3            8.5


       Red Blood Count                            2.85  L       3.51  #L


       Hemoglobin                                  8.6  L       10.5  #L


       Hematocrit                                 27.0  L       32.7  #L


       Mean Corpuscular Volume                     94.7           93.2


       Mean Corpuscular Hemoglobin                 30.2           29.9


       Mean Corpuscular Hemoglobin
Concent       31.9  L
        32.1  



       Red Cell Distribution Width                 13.1           12.9


       Platelet Count                             560  #H         491  H


       Mean Platelet Volume                         8.2            7.9


       Immature Granulocytes %                   1.400  H       1.300  H


       Neutrophils %                               58.0           60.0


       Lymphocytes %                               25.8           23.3


       Monocytes %                                11.2  H        12.3  H


       Eosinophils %                                3.0            2.5


       Basophils %                                  0.6            0.6


       Nucleated Red Blood Cells %                  0.0            0.0


       Immature Granulocytes #                   0.120  H       0.110  H


       Neutrophils #                                4.8            5.1


       Lymphocytes #                                2.2            2.0


       Monocytes #                                  0.9           1.1  H


       Eosinophils #                                0.3            0.2


       Basophils #                                  0.1            0.1


       Nucleated Red Blood Cells #                  0.0            0.0


       Sodium Level                                 139


       Potassium Level                              4.3


       Chloride Level                               104


       Carbon Dioxide Level                         32  H


       Anion Gap                                     3  L


       Blood Urea Nitrogen                           12


       Creatinine                                  0.88


       Est Glomerular Filtrat Rate
mL/min   > 60  
        



       Glucose Level                                 91


       Calcium Level                                8.6


       Total Bilirubin                              0.2


       Direct Bilirubin                            0.00


       Indirect Bilirubin                           0.2


       Aspartate Amino Transf
(AST/SGOT)            26  
  



       Alanine Aminotransferase
(ALT/SGPT)          54  
  



       Alkaline Phosphatase                          98


       Total Protein                                6.4


       Albumin                                     2.9  L


       Globulin                                   3.50  H


       Albumin/Globulin Ratio                      0.82








Medications


Medication





Current Medications


IV Flush (NS 3 ml) 3 ml PER PROTOCOL IV ;  Start 6/10/19 at 20:30


Ondansetron HCl (Zofran Inj) 4 mg Q6H  PRN IV NAUSEA/VOMITING;  Start 6/10/19 at


20:30


Acetaminophen (Tylenol Tab) 650 mg Q6H  PRN PO .PAIN 1-3 OR TEMP;  Start 6/10/19


at 20:30


Morphine Sulfate (morphine) 2 mg Q4H  PRN IV .SEVERE PAIN 7-10 Last administered


on 6/13/19at 16:13; Admin Dose 2 MG;  Start 6/10/19 at 20:30;  Status Hold


Docusate Sodium (Colace) 100 mg Q12H  PRN PO .CONSTIPATION;  Start 6/10/19 at 


20:30


Bisacodyl (Dulcolax) 5 mg DAILY  PRN PO .CONSTIPATION;  Start 6/10/19 at 20:30


Sodium Hypochlorite (Dakins Diluted (1/40)) 1 applic DAILY TP  Last administered


on 6/15/19at 11:31; Admin Dose 1 APPLIC;  Start 6/12/19 at 14:00


Methadone HCl (Methadone Liq) 30 mg AM PO  Last administered on 6/15/19at 09:10;


Admin Dose 30 MG;  Start 6/14/19 at 09:00


Miconazole Nitrate (Miconazole 2% Cr) 1 applic BID TOP  Last administered on 


6/15/19at 11:32; Admin Dose 1 APPLIC;  Start 6/13/19 at 19:30;  Stop 6/27/19 at 


23:00


Lorazepam (Ativan) 1 mg Q4H  PRN IV AGITATION/ANXIETY;  Start 6/13/19 at 23:00


Morphine Sulfate (morphine) 1 mg Q6H  PRN IV SEVERE PAIN LEVEL 7-10 Last 


administered on 6/13/19at 23:07; Admin Dose 1 MG;  Start 6/13/19 at 23:00


Trimethoprim/ Sulfamethoxazole (Bactrim (Ds)) 1 tab BID PO  Last administered on


6/15/19at 09:11; Admin Dose 1 TAB;  Start 6/14/19 at 21:00











GEOREGS MCHUGH MD              Jose 15, 2019 12:04

## 2019-06-19 ENCOUNTER — HOSPITAL ENCOUNTER (EMERGENCY)
Dept: HOSPITAL 91 - FTE | Age: 39
Discharge: HOME | End: 2019-06-19
Payer: COMMERCIAL

## 2019-06-19 ENCOUNTER — HOSPITAL ENCOUNTER (EMERGENCY)
Dept: HOSPITAL 10 - FTE | Age: 39
Discharge: HOME | End: 2019-06-19
Payer: COMMERCIAL

## 2019-06-19 VITALS — DIASTOLIC BLOOD PRESSURE: 70 MMHG | RESPIRATION RATE: 24 BRPM | HEART RATE: 107 BPM | SYSTOLIC BLOOD PRESSURE: 136 MMHG

## 2019-06-19 VITALS
HEIGHT: 72 IN | BODY MASS INDEX: 20.9 KG/M2 | WEIGHT: 154.32 LBS | BODY MASS INDEX: 20.9 KG/M2 | WEIGHT: 154.32 LBS | HEIGHT: 72 IN

## 2019-06-19 DIAGNOSIS — F11.90: Primary | ICD-10-CM

## 2019-06-19 DIAGNOSIS — F17.210: ICD-10-CM

## 2019-06-19 PROCEDURE — 99281 EMR DPT VST MAYX REQ PHY/QHP: CPT

## 2019-06-19 RX ADMIN — LORAZEPAM 1 MG: 0.5 TABLET ORAL at 14:43

## 2019-06-19 NOTE — ERD
ER Documentation


Chief Complaint


Chief Complaint





wound check  ( left hip)





HPI


Patient is a 38-year-old male, heroin drug abuser, presents to the ER for a 


wound recheck.  Patient is brought in by the [facility] division and his 


sister for his wound recheck.  Patient was discharged from this facility on 


6/15/19 after undergoing incision and drainage of his left gluteal abscess.  


Patient states he has not filled his antibiotics however he did  the 


prescription today.  Patient denies any fevers or chills.  Patient does report 


doing wound care with last dressing change earlier today.  Patient states his 


last heroin use was at 8 AM today.  Patient denies any chest pain, shortness of 


breath, nausea vomiting or LOC at this time.  Patient denies any homicidal or 


suicidal ideations.





ROS


All systems reviewed and are negative except as per history of present illness.





Medications


Home Meds


Active Scripts


Sulfamethoxazole/Trimethoprim (Sulfamethoxazole-Tmp Ds Tablet) 1 Each Tablet, 1 


TAB PO BID for 7 Days, #14 TAB


   Prov:GEORGES MCHUGH MD         6/15/19


Ibuprofen* (Motrin*) 600 Mg Tab, 600 MG PO Q6, #30 TAB


   Prov:ARNAV ROTHMAN         2/28/18


Discontinued Scripts


Amoxicillin* (Amoxicillin*) 500 Mg Cap, 500 MG PO BID for 7 Days, CAP


   Prov:ARNAV ROTHMAN         2/28/18





Allergies


Allergies:  


Coded Allergies:  


     No Known Drug Allergies (Verified  Allergy, Unknown, 2/28/18)





PMhx/Soc


History of Surgery:  Yes


Anesthesia Reaction:  No


Hx Neurological Disorder:  No


Hx Respiratory Disorders:  Yes (Emphysema)


Hx Cardiac Disorders:  No


Hx Psychiatric Problems:  Yes (Depression, anxiety)


Hx Miscellaneous Medical Probl:  Yes (Screws on L hand)


Hx Alcohol Use:  Yes (Quit 15 years ago)


Hx Substance Use:  Yes (Current Heroin addiction, meth use)


Hx Tobacco Use:  Yes (Half Pack day)





FmHx


Family History:  No diabetes





Physical Exam


Vitals





Vital Signs


  Date      Temp  Pulse  Resp  B/P (MAP)   Pulse Ox  O2          O2 Flow    FiO2


Time                                                 Delivery    Rate


   6/19/19  98.3    107    24      136/70        97


     13:05                           (92)





Physical Exam


GENERAL: Well-developed, well-nourished male. Appears in no acute distress.  


Speaking in full sentences.


HEAD: Normocephalic, atraumatic. 


EYES: Pupils are equally reactive bilaterally. EOMs grossly intact. No 


conjunctival erythema. 


NECK: Supple. No meningismus. Normal range of motion of the neck.


EXTREMITIES: Equal pulses bilaterally. No peripheral clubbing, cyanosis or 


edema. No unilateral leg swelling.


NEUROLOGIC: Alert and oriented. Moving all four extremities without any 


difficulty. Normal speech. Steady gait. 


SKIN: 1 inch opening noted on the left gluteus, dry, no active bleeding or 


discharge. No deep structures visualized.  No bony structures visualized.  1 cm 


opening noted above larger opening.  Again this wound is dry, no active bleeding


or discharge.  No surrounding redness. No macerated skin. No streaking.  No 


warmth.


Results 24 hrs





Current Medications


 Medications
   Dose
          Sig/Rl
       Start Time
   Status  Last


 (Trade)       Ordered        Route
 PRN     Stop Time              Admin
Dose


                              Reason                                Admin


 Lorazepam
     0.5 mg         ONCE  ONCE
    6/19/19       DC       



(Ativan)                      PO
            15:00



                                             6/19/19 15:00








Procedures/MDM


MEDICAL DECISION MAKING:


This is a 38-year-old male, heroin drug user, who presents the ER for concerns 


of a wound recheck.. Vital signs were reviewed. Patient is afebrile.  Patient 


was not hypoxic.  Patient had no signs of acute respiratory distress.  The wound


appears to be healing well with no concerns of acute infection at this time. No 


wound drainage or wound dehiscence noted.  Wound dressings were performed.





Patient and his sister did express concerns for admitting patient to detox 


program.  Patient requested to speak with  Hebert, who was called 


from the floor.   Daniel did provide patient and sister with detox 


program information.  See his note.  Patient had no homicidal or suicidal 


ideations.





PRESCRIPTIONS:


Continue to take antibiotics as prescribed. Complete full course. 





DISCHARGE:


At this time, the patient is stable for discharge and outpatient management. 


Post-procedural wound care was discussed with the patient. I have instructed the


patient to promptly return to the ER for any new or worsening symptoms including


increasing pain, fever, warmth, redness or swelling. The patient and/or family 


expressed understanding of and agreement with this plan. All questions were 


answered. Home care instructions were provided. 





Disclaimer: Inadvertent spelling and grammatical errors are likely due to 


EHR/dictation software use and do not reflect on the overall quality of patient 


care. Also, please note that the electronic time recorded on this note does not 


necessarily reflect the actual time of the patient encounter.





Departure


Diagnosis:  


   Primary Impression:  


   Encounter for wound re-check


   Additional Impression:  


   Heroin user


Condition:  Fair


Patient Instructions:  Wound Care


Referrals:  


[facility]


YOU HAVE RECEIVED A MEDICAL SCREENING EXAM AND THE RESULTS INDICATE THAT YOU DO 


NOT HAVE A CONDITION THAT REQUIRES URGENT TREATMENT IN THE EMERGENCY DEPARTMENT.





FURTHER EVALUATION AND TREATMENT OF YOUR CONDITION CAN WAIT UNTIL YOU ARE SEEN 


IN YOUR DOCTORS OFFICE WITHIN THE NEXT 1-2 DAYS. IT IS YOUR RESPONSIBILITY TO 


MAKE AN APPOINTMENT FOR FOLOW-UP CARE.





IF YOU HAVE A PRIMARY DOCTOR


--you should call your primary doctor and schedule an appointment





IF YOU DO NOT HAVE A PRIMARY DOCTOR YOU CAN CALL OUR PHYSICIAN REFERRAL HOTLINE 


AT


 [phone] 





IF YOU CAN NOT AFFORD TO SEE A PHYSICIAN YOU CAN CHOSE FROM THE FOLLOWING 


[facility] [phone] [address] [facility]. [facility] [phone] [address] [facility]. [facility] [phone] [address] [facility] [phone] [address] [facility] [phone] [address] [facility] [facility]. [facility] [phone] [address] [facility]. [facility]


YOU HAVE RECEIVED A MEDICAL SCREENING EXAM AND THE RESULTS INDICATE THAT YOU DO 


NOT HAVE A CONDITION THAT REQUIRES URGENT TREATMENT IN THE EMERGENCY DEPARTMENT.





FURTHER EVALUATION AND TREATMENT OF YOUR CONDITION CAN WAIT UNTIL YOU ARE SEEN 


IN YOUR DOCTORS OFFICE WITHIN THE NEXT 1-2 DAYS. IT IS YOUR RESPONSIBILITY TO 


MAKE AN APPOINTMENT FOR FOLOW-UP CARE.





IF YOU HAVE A PRIMARY DOCTOR


--you should call your primary doctor and schedule and appointment





IF YOU DO NOT HAVE A PRIMARY DOCTOR YOU CAN CALL OUR PHYSICIAN REFERRAL HOTLINE 


AT [phone].





IF YOU CAN NOT AFFORD TO SEE A PHYSICIAN YOU CAN CHOSE FROM THE FOLLOWING [facility]


INSTITUTIONS:





[facility]


[address] [place] [address]





[facility]


[address] [place] [address]





[facility]


[address] [place] [address]





[facility] URGENT CARE/SPECIALTIES





Additional Instructions:  


Call your primary care doctor TOMORROW for an appointment during the next 1-2 


days.See the doctor sooner or return here if your condition worsens before your 


appointment time.











PATTI SCHWARTZ PA-C             Jun 19, 2019 13:52